# Patient Record
Sex: FEMALE | Race: BLACK OR AFRICAN AMERICAN | NOT HISPANIC OR LATINO
[De-identification: names, ages, dates, MRNs, and addresses within clinical notes are randomized per-mention and may not be internally consistent; named-entity substitution may affect disease eponyms.]

---

## 2017-11-07 ENCOUNTER — APPOINTMENT (OUTPATIENT)
Dept: SURGERY | Facility: CLINIC | Age: 37
End: 2017-11-07

## 2017-12-22 ENCOUNTER — APPOINTMENT (OUTPATIENT)
Dept: SURGERY | Facility: CLINIC | Age: 37
End: 2017-12-22

## 2018-01-05 ENCOUNTER — MESSAGE (OUTPATIENT)
Age: 38
End: 2018-01-05

## 2018-01-12 ENCOUNTER — APPOINTMENT (OUTPATIENT)
Dept: SURGERY | Facility: CLINIC | Age: 38
End: 2018-01-12
Payer: MEDICAID

## 2018-01-12 VITALS
BODY MASS INDEX: 37.61 KG/M2 | OXYGEN SATURATION: 96 % | DIASTOLIC BLOOD PRESSURE: 81 MMHG | HEIGHT: 66 IN | HEART RATE: 79 BPM | WEIGHT: 234 LBS | SYSTOLIC BLOOD PRESSURE: 119 MMHG | TEMPERATURE: 97.9 F

## 2018-01-12 DIAGNOSIS — M25.50 PAIN IN UNSPECIFIED JOINT: ICD-10-CM

## 2018-01-12 LAB
25(OH)D3 SERPL-MCNC: 10.1 NG/ML
ALBUMIN SERPL ELPH-MCNC: 4.6 G/DL
ALP BLD-CCNC: 101 U/L
ALT SERPL-CCNC: 19 U/L
ANION GAP SERPL CALC-SCNC: 18 MMOL/L
AST SERPL-CCNC: 22 U/L
BASOPHILS # BLD AUTO: 0.03 K/UL
BASOPHILS NFR BLD AUTO: 0.3 %
BILIRUB SERPL-MCNC: 0.3 MG/DL
BUN SERPL-MCNC: 15 MG/DL
CA-I SERPL-SCNC: 1.27 MMOL/L
CALCIUM SERPL-MCNC: 9.7 MG/DL
CALCIUM SERPL-MCNC: 9.7 MG/DL
CHLORIDE SERPL-SCNC: 100 MMOL/L
CHOLEST SERPL-MCNC: 203 MG/DL
CHOLEST/HDLC SERPL: 3.8 RATIO
CO2 SERPL-SCNC: 22 MMOL/L
CREAT SERPL-MCNC: 1.08 MG/DL
EOSINOPHIL # BLD AUTO: 0.29 K/UL
EOSINOPHIL NFR BLD AUTO: 3.3 %
FOLATE SERPL-MCNC: 14.9 NG/ML
GLUCOSE SERPL-MCNC: 112 MG/DL
HBA1C MFR BLD HPLC: 5.8 %
HCT VFR BLD CALC: 42.1 %
HDLC SERPL-MCNC: 54 MG/DL
HGB BLD-MCNC: 13.5 G/DL
IMM GRANULOCYTES NFR BLD AUTO: 0.2 %
INR PPP: 0.91 RATIO
IRON SATN MFR SERPL: 15 %
IRON SERPL-MCNC: 65 UG/DL
LDLC SERPL CALC-MCNC: 118 MG/DL
LYMPHOCYTES # BLD AUTO: 2.94 K/UL
LYMPHOCYTES NFR BLD AUTO: 33 %
MAN DIFF?: NORMAL
MCHC RBC-ENTMCNC: 28.8 PG
MCHC RBC-ENTMCNC: 32.1 GM/DL
MCV RBC AUTO: 89.8 FL
MONOCYTES # BLD AUTO: 0.58 K/UL
MONOCYTES NFR BLD AUTO: 6.5 %
NEUTROPHILS # BLD AUTO: 5.05 K/UL
NEUTROPHILS NFR BLD AUTO: 56.7 %
PARATHYROID HORMONE INTACT: 73 PG/ML
PLATELET # BLD AUTO: 264 K/UL
POTASSIUM SERPL-SCNC: 4.9 MMOL/L
PREALB SERPL NEPH-MCNC: 34 MG/DL
PROT SERPL-MCNC: 8.1 G/DL
PT BLD: 10.3 SEC
RBC # BLD: 4.69 M/UL
RBC # FLD: 12.9 %
SODIUM SERPL-SCNC: 140 MMOL/L
TIBC SERPL-MCNC: 436 UG/DL
TRIGL SERPL-MCNC: 157 MG/DL
TSH SERPL-ACNC: 1.73 UIU/ML
UIBC SERPL-MCNC: 371 UG/DL
VIT B12 SERPL-MCNC: 350 PG/ML
WBC # FLD AUTO: 8.91 K/UL

## 2018-01-12 PROCEDURE — 99213 OFFICE O/P EST LOW 20 MIN: CPT

## 2018-01-16 ENCOUNTER — APPOINTMENT (OUTPATIENT)
Dept: SURGERY | Facility: CLINIC | Age: 38
End: 2018-01-16

## 2018-01-16 LAB
VIT A SERPL-MCNC: 87 UG/DL
VIT B1 SERPL-MCNC: 135.6 NMOL/L
ZINC SERPL-MCNC: 79 UG/DL

## 2018-01-17 LAB
A-TOCOPHEROL VIT E SERPL-MCNC: 12 MG/L
BETA+GAMMA TOCOPHEROL SERPL-MCNC: 1.7 MG/L

## 2018-01-17 RX ORDER — ERGOCALCIFEROL 1.25 MG/1
1.25 MG CAPSULE, LIQUID FILLED ORAL
Qty: 4 | Refills: 2 | Status: ACTIVE | COMMUNITY
Start: 2018-01-17 | End: 1900-01-01

## 2018-01-18 ENCOUNTER — APPOINTMENT (OUTPATIENT)
Dept: SURGERY | Facility: CLINIC | Age: 38
End: 2018-01-18

## 2018-01-20 ENCOUNTER — TRANSCRIPTION ENCOUNTER (OUTPATIENT)
Age: 38
End: 2018-01-20

## 2018-01-23 ENCOUNTER — APPOINTMENT (OUTPATIENT)
Dept: SURGERY | Facility: CLINIC | Age: 38
End: 2018-01-23

## 2018-02-05 ENCOUNTER — APPOINTMENT (OUTPATIENT)
Dept: RADIOLOGY | Facility: HOSPITAL | Age: 38
End: 2018-02-05

## 2018-02-05 ENCOUNTER — OUTPATIENT (OUTPATIENT)
Dept: OUTPATIENT SERVICES | Facility: HOSPITAL | Age: 38
LOS: 1 days | End: 2018-02-05
Payer: COMMERCIAL

## 2018-02-05 PROCEDURE — 74241: CPT

## 2018-02-05 PROCEDURE — 74241: CPT | Mod: 26

## 2018-02-09 ENCOUNTER — OUTPATIENT (OUTPATIENT)
Dept: OUTPATIENT SERVICES | Facility: HOSPITAL | Age: 38
LOS: 1 days | End: 2018-02-09
Payer: COMMERCIAL

## 2018-02-09 ENCOUNTER — APPOINTMENT (OUTPATIENT)
Dept: SURGERY | Facility: CLINIC | Age: 38
End: 2018-02-09
Payer: MEDICAID

## 2018-02-09 VITALS
TEMPERATURE: 98.1 F | WEIGHT: 237.5 LBS | HEIGHT: 66 IN | OXYGEN SATURATION: 97 % | SYSTOLIC BLOOD PRESSURE: 133 MMHG | DIASTOLIC BLOOD PRESSURE: 81 MMHG | HEART RATE: 76 BPM | BODY MASS INDEX: 38.17 KG/M2

## 2018-02-09 DIAGNOSIS — E66.01 MORBID (SEVERE) OBESITY DUE TO EXCESS CALORIES: ICD-10-CM

## 2018-02-09 DIAGNOSIS — E55.9 VITAMIN D DEFICIENCY, UNSPECIFIED: ICD-10-CM

## 2018-02-09 LAB
ALBUMIN SERPL ELPH-MCNC: 4.7 G/DL — SIGNIFICANT CHANGE UP (ref 3.3–5)
ALP SERPL-CCNC: 90 U/L — SIGNIFICANT CHANGE UP (ref 40–120)
ALT FLD-CCNC: 15 U/L — SIGNIFICANT CHANGE UP (ref 10–45)
ANION GAP SERPL CALC-SCNC: 13 MMOL/L — SIGNIFICANT CHANGE UP (ref 5–17)
APPEARANCE UR: (no result)
APTT BLD: 28.2 SEC — SIGNIFICANT CHANGE UP (ref 27.5–37.4)
AST SERPL-CCNC: 19 U/L — SIGNIFICANT CHANGE UP (ref 10–40)
BACTERIA # UR AUTO: PRESENT /HPF
BILIRUB SERPL-MCNC: 0.5 MG/DL — SIGNIFICANT CHANGE UP (ref 0.2–1.2)
BILIRUB UR-MCNC: NEGATIVE — SIGNIFICANT CHANGE UP
BLD GP AB SCN SERPL QL: NEGATIVE — SIGNIFICANT CHANGE UP
BLD GP AB SCN SERPL QL: NEGATIVE — SIGNIFICANT CHANGE UP
BUN SERPL-MCNC: 9 MG/DL — SIGNIFICANT CHANGE UP (ref 7–23)
CALCIUM SERPL-MCNC: 9.3 MG/DL — SIGNIFICANT CHANGE UP (ref 8.4–10.5)
CHLORIDE SERPL-SCNC: 100 MMOL/L — SIGNIFICANT CHANGE UP (ref 96–108)
CO2 SERPL-SCNC: 26 MMOL/L — SIGNIFICANT CHANGE UP (ref 22–31)
COLOR SPEC: YELLOW — SIGNIFICANT CHANGE UP
CREAT SERPL-MCNC: 0.88 MG/DL — SIGNIFICANT CHANGE UP (ref 0.5–1.3)
DIFF PNL FLD: NEGATIVE — SIGNIFICANT CHANGE UP
EPI CELLS # UR: (no result) /HPF (ref 0–5)
GLUCOSE SERPL-MCNC: 103 MG/DL — HIGH (ref 70–99)
GLUCOSE UR QL: NEGATIVE — SIGNIFICANT CHANGE UP
HBA1C BLD-MCNC: 5.7 % — HIGH (ref 4–5.6)
HCG UR QL: NEGATIVE — SIGNIFICANT CHANGE UP
HCT VFR BLD CALC: 39.7 % — SIGNIFICANT CHANGE UP (ref 34.5–45)
HGB BLD-MCNC: 12.8 G/DL — SIGNIFICANT CHANGE UP (ref 11.5–15.5)
INR BLD: 0.95 — SIGNIFICANT CHANGE UP (ref 0.88–1.16)
KETONES UR-MCNC: NEGATIVE — SIGNIFICANT CHANGE UP
LEUKOCYTE ESTERASE UR-ACNC: NEGATIVE — SIGNIFICANT CHANGE UP
MCHC RBC-ENTMCNC: 28.4 PG — SIGNIFICANT CHANGE UP (ref 27–34)
MCHC RBC-ENTMCNC: 32.2 G/DL — SIGNIFICANT CHANGE UP (ref 32–36)
MCV RBC AUTO: 88 FL — SIGNIFICANT CHANGE UP (ref 80–100)
NITRITE UR-MCNC: NEGATIVE — SIGNIFICANT CHANGE UP
PH UR: 6.5 — SIGNIFICANT CHANGE UP (ref 5–8)
PLATELET # BLD AUTO: 232 K/UL — SIGNIFICANT CHANGE UP (ref 150–400)
POTASSIUM SERPL-MCNC: 4.3 MMOL/L — SIGNIFICANT CHANGE UP (ref 3.5–5.3)
POTASSIUM SERPL-SCNC: 4.3 MMOL/L — SIGNIFICANT CHANGE UP (ref 3.5–5.3)
PROT SERPL-MCNC: 7.9 G/DL — SIGNIFICANT CHANGE UP (ref 6–8.3)
PROT UR-MCNC: (no result) MG/DL
PROTHROM AB SERPL-ACNC: 10.5 SEC — SIGNIFICANT CHANGE UP (ref 9.8–12.7)
RBC # BLD: 4.51 M/UL — SIGNIFICANT CHANGE UP (ref 3.8–5.2)
RBC # FLD: 12.8 % — SIGNIFICANT CHANGE UP (ref 10.3–16.9)
RBC CASTS # UR COMP ASSIST: < 5 /HPF — SIGNIFICANT CHANGE UP
RH IG SCN BLD-IMP: POSITIVE — SIGNIFICANT CHANGE UP
RH IG SCN BLD-IMP: POSITIVE — SIGNIFICANT CHANGE UP
SODIUM SERPL-SCNC: 139 MMOL/L — SIGNIFICANT CHANGE UP (ref 135–145)
SP GR SPEC: 1.02 — SIGNIFICANT CHANGE UP (ref 1–1.03)
TSH SERPL-MCNC: 0.75 UIU/ML — SIGNIFICANT CHANGE UP (ref 0.35–4.94)
UROBILINOGEN FLD QL: 0.2 E.U./DL — SIGNIFICANT CHANGE UP
WBC # BLD: 9.6 K/UL — SIGNIFICANT CHANGE UP (ref 3.8–10.5)
WBC # FLD AUTO: 9.6 K/UL — SIGNIFICANT CHANGE UP (ref 3.8–10.5)
WBC UR QL: < 5 /HPF — SIGNIFICANT CHANGE UP

## 2018-02-09 PROCEDURE — 86901 BLOOD TYPING SEROLOGIC RH(D): CPT

## 2018-02-09 PROCEDURE — 85610 PROTHROMBIN TIME: CPT

## 2018-02-09 PROCEDURE — 93005 ELECTROCARDIOGRAM TRACING: CPT

## 2018-02-09 PROCEDURE — 99214 OFFICE O/P EST MOD 30 MIN: CPT

## 2018-02-09 PROCEDURE — 84443 ASSAY THYROID STIM HORMONE: CPT

## 2018-02-09 PROCEDURE — 81025 URINE PREGNANCY TEST: CPT

## 2018-02-09 PROCEDURE — 83036 HEMOGLOBIN GLYCOSYLATED A1C: CPT

## 2018-02-09 PROCEDURE — 85027 COMPLETE CBC AUTOMATED: CPT

## 2018-02-09 PROCEDURE — 86900 BLOOD TYPING SEROLOGIC ABO: CPT

## 2018-02-09 PROCEDURE — 80053 COMPREHEN METABOLIC PANEL: CPT

## 2018-02-09 PROCEDURE — 85730 THROMBOPLASTIN TIME PARTIAL: CPT

## 2018-02-09 PROCEDURE — 86850 RBC ANTIBODY SCREEN: CPT

## 2018-02-09 PROCEDURE — 93010 ELECTROCARDIOGRAM REPORT: CPT

## 2018-02-09 PROCEDURE — 81001 URINALYSIS AUTO W/SCOPE: CPT

## 2018-02-22 ENCOUNTER — APPOINTMENT (OUTPATIENT)
Dept: SURGERY | Facility: CLINIC | Age: 38
End: 2018-02-22

## 2018-02-22 ENCOUNTER — OUTPATIENT (OUTPATIENT)
Dept: OUTPATIENT SERVICES | Facility: HOSPITAL | Age: 38
LOS: 1 days | End: 2018-02-22
Payer: COMMERCIAL

## 2018-02-22 PROCEDURE — 71046 X-RAY EXAM CHEST 2 VIEWS: CPT | Mod: 26

## 2018-02-22 PROCEDURE — 71046 X-RAY EXAM CHEST 2 VIEWS: CPT

## 2018-03-13 ENCOUNTER — APPOINTMENT (OUTPATIENT)
Dept: SURGERY | Facility: CLINIC | Age: 38
End: 2018-03-13

## 2018-05-04 ENCOUNTER — APPOINTMENT (OUTPATIENT)
Dept: SURGERY | Facility: CLINIC | Age: 38
End: 2018-05-04
Payer: MEDICAID

## 2018-05-04 VITALS
HEIGHT: 66 IN | TEMPERATURE: 98.2 F | OXYGEN SATURATION: 98 % | BODY MASS INDEX: 37.28 KG/M2 | WEIGHT: 232 LBS | DIASTOLIC BLOOD PRESSURE: 79 MMHG | SYSTOLIC BLOOD PRESSURE: 120 MMHG | HEART RATE: 66 BPM

## 2018-05-04 PROCEDURE — 99213 OFFICE O/P EST LOW 20 MIN: CPT

## 2018-06-29 ENCOUNTER — APPOINTMENT (OUTPATIENT)
Dept: SURGERY | Facility: CLINIC | Age: 38
End: 2018-06-29

## 2018-06-29 VITALS
HEIGHT: 66 IN | TEMPERATURE: 98 F | DIASTOLIC BLOOD PRESSURE: 89 MMHG | WEIGHT: 241.5 LBS | HEART RATE: 80 BPM | OXYGEN SATURATION: 96 % | SYSTOLIC BLOOD PRESSURE: 134 MMHG | BODY MASS INDEX: 38.81 KG/M2

## 2019-07-19 ENCOUNTER — EMERGENCY (EMERGENCY)
Facility: HOSPITAL | Age: 39
LOS: 1 days | Discharge: HOME | End: 2019-07-19
Admitting: EMERGENCY MEDICINE
Payer: MEDICAID

## 2019-07-19 VITALS
TEMPERATURE: 98 F | HEART RATE: 64 BPM | OXYGEN SATURATION: 100 % | SYSTOLIC BLOOD PRESSURE: 155 MMHG | DIASTOLIC BLOOD PRESSURE: 91 MMHG | RESPIRATION RATE: 17 BRPM

## 2019-07-19 PROCEDURE — 99283 EMERGENCY DEPT VISIT LOW MDM: CPT

## 2019-07-19 NOTE — ED ADULT TRIAGE NOTE - CHIEF COMPLAINT QUOTE
tooth pain from cracked tooth on right side of mouth cannot get a dentist appointment until monday. Denies fever/chills/bleeding, n/v.

## 2019-07-20 DIAGNOSIS — Z98.84 BARIATRIC SURGERY STATUS: Chronic | ICD-10-CM

## 2019-07-20 RX ORDER — OXYCODONE HYDROCHLORIDE 5 MG/1
5 TABLET ORAL ONCE
Refills: 0 | Status: DISCONTINUED | OUTPATIENT
Start: 2019-07-20 | End: 2019-07-20

## 2019-07-20 RX ORDER — IBUPROFEN 200 MG
800 TABLET ORAL ONCE
Refills: 0 | Status: COMPLETED | OUTPATIENT
Start: 2019-07-20 | End: 2019-07-20

## 2019-07-20 RX ADMIN — OXYCODONE HYDROCHLORIDE 5 MILLIGRAM(S): 5 TABLET ORAL at 01:05

## 2019-07-20 RX ADMIN — Medication 800 MILLIGRAM(S): at 02:33

## 2019-07-20 NOTE — ED PROVIDER NOTE - OBJECTIVE STATEMENT
37 y/o female with no pmhx presents to ED c/o right cracked tooth and pain x 2 weeks. States filling came off on its own. Has appointment with her dentist on Monday in 2 days although came to ER because of pain. Took 1,000mg of tylenol before coming to ER with no relief. 10/10pain. Tolerating po. No fever, chills, redness, discharge, swelling, drooling, sore throat, cp, sob, n/v .

## 2019-07-20 NOTE — ED PROVIDER NOTE - CLINICAL SUMMARY MEDICAL DECISION MAKING FREE TEXT BOX
37 y/o female with no pmhx presents to ED c/o right cracked tooth and pain x 2 weeks. no relief with tylenol at home, has dentist apt in 2 days. will give [facility] dental clinic information for follow up, percocet prn, ucg. discussed no driving given side effects.

## 2019-07-20 NOTE — ED PROVIDER NOTE - NSFOLLOWUPINSTRUCTIONS_ED_ALL_ED_FT
Follow up with your dentist  [facility] # [phone]    Take Percocet 325/5 once every 6 hours as needed for severe pain -- causes drowsiness; DO NOT drink alcohol, drive, or operate heavy machinery with this medication.     Return to ER for any new or worsening symptoms, fever, chills, redness, difficulty swallowing or any other concerns.

## 2019-10-08 ENCOUNTER — APPOINTMENT (OUTPATIENT)
Dept: SURGERY | Facility: CLINIC | Age: 39
End: 2019-10-08

## 2019-11-26 ENCOUNTER — APPOINTMENT (OUTPATIENT)
Dept: SURGERY | Facility: CLINIC | Age: 39
End: 2019-11-26

## 2020-01-14 ENCOUNTER — APPOINTMENT (OUTPATIENT)
Dept: SURGERY | Facility: CLINIC | Age: 40
End: 2020-01-14
Payer: MEDICAID

## 2020-01-14 VITALS
OXYGEN SATURATION: 97 % | HEART RATE: 92 BPM | SYSTOLIC BLOOD PRESSURE: 133 MMHG | BODY MASS INDEX: 39.29 KG/M2 | HEIGHT: 66 IN | WEIGHT: 244.44 LBS | DIASTOLIC BLOOD PRESSURE: 88 MMHG | TEMPERATURE: 98.8 F

## 2020-01-14 DIAGNOSIS — Z01.818 ENCOUNTER FOR OTHER PREPROCEDURAL EXAMINATION: ICD-10-CM

## 2020-01-14 DIAGNOSIS — Z00.00 ENCOUNTER FOR GENERAL ADULT MEDICAL EXAMINATION W/OUT ABNORMAL FINDINGS: ICD-10-CM

## 2020-01-14 PROCEDURE — 99213 OFFICE O/P EST LOW 20 MIN: CPT

## 2020-01-14 NOTE — ASSESSMENT
[FreeTextEntry1] : 40 y/o F with h/o morbid obesity (BMI 39), prediabetes (on Metformin), PCOS s/p ovarian cyst removal, s/p tonsillectomy 2008, s/p RYBG in 2008 with Dr. Chandler at [facility], with interest in revision workup. Will start patient with complete bariatric workup for revision surgery. Will follow up here as needed for help in completing workup.

## 2020-01-14 NOTE — END OF VISIT
[FreeTextEntry3] : All medical record entries made by the Scribe were at my, Dr. Chandler's, discretion and personally dictated by me on 01/14/2020. I have reviewed the chart and agree that the record accurately reflects my personal performance of the history, physical exam, assessment and plan. I have also personally directed, reviewed and agreed to the chart.

## 2020-01-14 NOTE — PHYSICAL EXAM
[Normal] : affect appropriate [Obese, well nourished, in no acute distress] : obese, well nourished, in no acute distress [Obese] : obese

## 2020-01-14 NOTE — ADDENDUM
[FreeTextEntry1] : This note was written by Natalie Robles on 01/14/2020  acting as scribe for Dr. Chandler

## 2020-01-14 NOTE — HISTORY OF PRESENT ILLNESS
[de-identified] : 38 y/o F with h/o morbid obesity (BMI 39), prediabetes (on Metformin), PCOS s/p ovarian cyst removal, s/p tonsillectomy 2008, s/p RYBG in 2008 with Dr. Chandler at [facility] presents today for follow up evaluation for consideration for RYBG revision operation. Our office submitted paperwork for two revision surgeries in 2018 but they were both denied by her insurance company due to BMI<40. Patient reports that she was initially successful with weight loss after her operation in 2008 and was down to 130 lb from 250 lb. She reports that she started to gain weight after she had children. Patient has 4 children whom she breastfeed. She continues to be compliant with vitamins and iron. She currently only walks for exercise due to knee pain and ankle swelling. She has not had any recent EGD or X-rays.

## 2020-01-29 ENCOUNTER — TRANSCRIPTION ENCOUNTER (OUTPATIENT)
Age: 40
End: 2020-01-29

## 2020-02-06 ENCOUNTER — FORM ENCOUNTER (OUTPATIENT)
Age: 40
End: 2020-02-06

## 2020-02-07 ENCOUNTER — APPOINTMENT (OUTPATIENT)
Dept: PULMONOLOGY | Facility: CLINIC | Age: 40
End: 2020-02-07
Payer: MEDICAID

## 2020-02-07 ENCOUNTER — OUTPATIENT (OUTPATIENT)
Dept: OUTPATIENT SERVICES | Facility: HOSPITAL | Age: 40
LOS: 1 days | End: 2020-02-07
Payer: COMMERCIAL

## 2020-02-07 VITALS
SYSTOLIC BLOOD PRESSURE: 110 MMHG | WEIGHT: 245 LBS | HEART RATE: 98 BPM | HEIGHT: 66 IN | DIASTOLIC BLOOD PRESSURE: 70 MMHG | TEMPERATURE: 98.2 F | OXYGEN SATURATION: 97 % | BODY MASS INDEX: 39.37 KG/M2

## 2020-02-07 DIAGNOSIS — R06.83 SNORING: ICD-10-CM

## 2020-02-07 DIAGNOSIS — Z83.438 FAMILY HISTORY OF OTHER DISORDER OF LIPOPROTEIN METABOLISM AND OTHER LIPIDEMIA: ICD-10-CM

## 2020-02-07 DIAGNOSIS — Z01.811 ENCOUNTER FOR PREPROCEDURAL RESPIRATORY EXAMINATION: ICD-10-CM

## 2020-02-07 DIAGNOSIS — I10 ESSENTIAL (PRIMARY) HYPERTENSION: ICD-10-CM

## 2020-02-07 DIAGNOSIS — Z98.84 BARIATRIC SURGERY STATUS: Chronic | ICD-10-CM

## 2020-02-07 PROCEDURE — 71046 X-RAY EXAM CHEST 2 VIEWS: CPT

## 2020-02-07 PROCEDURE — 94727 GAS DIL/WSHOT DETER LNG VOL: CPT

## 2020-02-07 PROCEDURE — 94729 DIFFUSING CAPACITY: CPT

## 2020-02-07 PROCEDURE — 99204 OFFICE O/P NEW MOD 45 MIN: CPT | Mod: 25

## 2020-02-07 PROCEDURE — 94060 EVALUATION OF WHEEZING: CPT

## 2020-02-07 PROCEDURE — 71046 X-RAY EXAM CHEST 2 VIEWS: CPT | Mod: 26

## 2020-02-07 RX ORDER — PRENATAL VIT NO.130/IRON/FOLIC 27MG-0.8MG
28-0.8 TABLET ORAL
Qty: 30 | Refills: 0 | Status: DISCONTINUED | COMMUNITY
Start: 2017-04-20 | End: 2020-02-07

## 2020-02-07 RX ORDER — METFORMIN HYDROCHLORIDE 500 MG/1
500 TABLET, COATED ORAL
Qty: 60 | Refills: 5 | Status: ACTIVE | COMMUNITY
Start: 2020-02-07

## 2020-02-07 RX ORDER — NORGESTIMATE AND ETHINYL ESTRADIOL 7DAYSX3 28
0.18/0.215/0.25 KIT ORAL
Qty: 28 | Refills: 0 | Status: DISCONTINUED | COMMUNITY
Start: 2017-06-29 | End: 2020-02-07

## 2020-02-07 NOTE — PHYSICAL EXAM
[No Acute Distress] : no acute distress [Normal Oropharynx] : normal oropharynx [Normal Appearance] : normal appearance [No Neck Mass] : no neck mass [Normal S1, S2] : normal s1, s2 [Normal Rate/Rhythm] : normal rate/rhythm [No Murmurs] : no murmurs [No Resp Distress] : no resp distress [Clear to Auscultation Bilaterally] : clear to auscultation bilaterally [Benign] : benign [No Abnormalities] : no abnormalities [No Clubbing] : no clubbing [Normal Gait] : normal gait [FROM] : FROM [No Edema] : no edema [No Cyanosis] : no cyanosis [No Focal Deficits] : no focal deficits [Normal Color/ Pigmentation] : normal color/ pigmentation [Oriented x3] : oriented x3 [Normal Affect] : normal affect

## 2020-02-18 ENCOUNTER — APPOINTMENT (OUTPATIENT)
Dept: BARIATRICS | Facility: CLINIC | Age: 40
End: 2020-02-18

## 2020-02-18 VITALS — BODY MASS INDEX: 40.72 KG/M2 | HEIGHT: 66 IN | WEIGHT: 253.38 LBS

## 2020-02-18 LAB
25(OH)D3 SERPL-MCNC: 13 NG/ML
A-TOCOPHEROL VIT E SERPL-MCNC: 10 MG/L
ALBUMIN SERPL ELPH-MCNC: 4.8 G/DL
ALP BLD-CCNC: 123 U/L
ALT SERPL-CCNC: 12 U/L
ANION GAP SERPL CALC-SCNC: 13 MMOL/L
APPEARANCE: CLEAR
AST SERPL-CCNC: 17 U/L
BACTERIA: NEGATIVE
BASOPHILS # BLD AUTO: 0.07 K/UL
BASOPHILS NFR BLD AUTO: 0.8 %
BETA+GAMMA TOCOPHEROL SERPL-MCNC: 1.8 MG/L
BILIRUB SERPL-MCNC: 0.3 MG/DL
BILIRUBIN URINE: NEGATIVE
BLOOD URINE: NEGATIVE
BUN SERPL-MCNC: 10 MG/DL
CA-I SERPL-SCNC: 1.22 MMOL/L
CALCIUM SERPL-MCNC: 9.7 MG/DL
CALCIUM SERPL-MCNC: 9.7 MG/DL
CHLORIDE SERPL-SCNC: 101 MMOL/L
CHOLEST SERPL-MCNC: 192 MG/DL
CHOLEST/HDLC SERPL: 3.8 RATIO
CO2 SERPL-SCNC: 24 MMOL/L
COLOR: YELLOW
CREAT SERPL-MCNC: 0.95 MG/DL
EOSINOPHIL # BLD AUTO: 0.32 K/UL
EOSINOPHIL NFR BLD AUTO: 3.4 %
ESTIMATED AVERAGE GLUCOSE: 143 MG/DL
FOLATE SERPL-MCNC: 9.7 NG/ML
GLUCOSE QUALITATIVE U: NEGATIVE
GLUCOSE SERPL-MCNC: 110 MG/DL
HBA1C MFR BLD HPLC: 6.6 %
HCG SERPL QL: NEGATIVE
HCT VFR BLD CALC: 41.9 %
HDLC SERPL-MCNC: 51 MG/DL
HGB BLD-MCNC: 13.1 G/DL
HYALINE CASTS: 2 /LPF
IMM GRANULOCYTES NFR BLD AUTO: 0.4 %
INR PPP: 0.93 RATIO
IRON SATN MFR SERPL: 9 %
IRON SERPL-MCNC: 42 UG/DL
KETONES URINE: NEGATIVE
LDLC SERPL CALC-MCNC: 97 MG/DL
LEUKOCYTE ESTERASE URINE: NEGATIVE
LYMPHOCYTES # BLD AUTO: 2.98 K/UL
LYMPHOCYTES NFR BLD AUTO: 32.1 %
MAN DIFF?: NORMAL
MCHC RBC-ENTMCNC: 28.4 PG
MCHC RBC-ENTMCNC: 31.3 GM/DL
MCV RBC AUTO: 90.7 FL
MICROSCOPIC-UA: NORMAL
MONOCYTES # BLD AUTO: 0.57 K/UL
MONOCYTES NFR BLD AUTO: 6.1 %
NEUTROPHILS # BLD AUTO: 5.31 K/UL
NEUTROPHILS NFR BLD AUTO: 57.2 %
NITRITE URINE: NEGATIVE
PAPP-A SERPL-ACNC: 3 MIU/ML
PARATHYROID HORMONE INTACT: 130 PG/ML
PH URINE: 7.5
PLATELET # BLD AUTO: 311 K/UL
POTASSIUM SERPL-SCNC: 4.9 MMOL/L
PREALB SERPL NEPH-MCNC: 31 MG/DL
PROT SERPL-MCNC: 7.8 G/DL
PROTEIN URINE: ABNORMAL
PT BLD: 10.5 SEC
RBC # BLD: 4.62 M/UL
RBC # FLD: 12.8 %
RED BLOOD CELLS URINE: 5 /HPF
SODIUM SERPL-SCNC: 138 MMOL/L
SPECIFIC GRAVITY URINE: 1.02
SQUAMOUS EPITHELIAL CELLS: 5 /HPF
TIBC SERPL-MCNC: 452 UG/DL
TRIGL SERPL-MCNC: 221 MG/DL
TSH SERPL-ACNC: 1.39 UIU/ML
UIBC SERPL-MCNC: 410 UG/DL
UREA BREATH TEST QL: POSITIVE
UROBILINOGEN URINE: NORMAL
VIT A SERPL-MCNC: 58.7 UG/DL
VIT B1 SERPL-MCNC: 129.5 NMOL/L
VIT B12 SERPL-MCNC: 245 PG/ML
WBC # FLD AUTO: 9.29 K/UL
WHITE BLOOD CELLS URINE: 3 /HPF
ZINC SERPL-MCNC: 69 UG/DL

## 2020-02-20 ENCOUNTER — APPOINTMENT (OUTPATIENT)
Dept: BARIATRICS | Facility: CLINIC | Age: 40
End: 2020-02-20

## 2020-02-20 RX ORDER — CLARITHROMYCIN 500 MG/1
500 TABLET, FILM COATED ORAL TWICE DAILY
Qty: 28 | Refills: 0 | Status: ACTIVE | COMMUNITY
Start: 2020-02-20 | End: 1900-01-01

## 2020-02-20 RX ORDER — FLUCONAZOLE 150 MG/1
150 TABLET ORAL DAILY
Qty: 7 | Refills: 0 | Status: ACTIVE | COMMUNITY
Start: 2020-02-20 | End: 1900-01-01

## 2020-02-20 RX ORDER — OMEPRAZOLE 20 MG/1
20 CAPSULE, DELAYED RELEASE ORAL TWICE DAILY
Qty: 28 | Refills: 0 | Status: ACTIVE | COMMUNITY
Start: 2020-02-20 | End: 1900-01-01

## 2020-02-20 RX ORDER — AMOXICILLIN 500 MG/1
500 TABLET, FILM COATED ORAL TWICE DAILY
Qty: 56 | Refills: 0 | Status: ACTIVE | COMMUNITY
Start: 2020-02-20 | End: 1900-01-01

## 2020-02-25 ENCOUNTER — OUTPATIENT (OUTPATIENT)
Dept: OUTPATIENT SERVICES | Facility: HOSPITAL | Age: 40
LOS: 1 days | End: 2020-02-25
Payer: COMMERCIAL

## 2020-02-25 ENCOUNTER — APPOINTMENT (OUTPATIENT)
Dept: ULTRASOUND IMAGING | Facility: HOSPITAL | Age: 40
End: 2020-02-25
Payer: MEDICAID

## 2020-02-25 ENCOUNTER — APPOINTMENT (OUTPATIENT)
Dept: RADIOLOGY | Facility: HOSPITAL | Age: 40
End: 2020-02-25
Payer: MEDICAID

## 2020-02-25 DIAGNOSIS — Z98.84 BARIATRIC SURGERY STATUS: Chronic | ICD-10-CM

## 2020-02-25 PROCEDURE — 76700 US EXAM ABDOM COMPLETE: CPT | Mod: 26

## 2020-02-25 PROCEDURE — 74240 X-RAY XM UPR GI TRC 1CNTRST: CPT | Mod: 26

## 2020-02-25 PROCEDURE — 74240 X-RAY XM UPR GI TRC 1CNTRST: CPT

## 2020-02-25 PROCEDURE — 76700 US EXAM ABDOM COMPLETE: CPT

## 2020-03-16 ENCOUNTER — FORM ENCOUNTER (OUTPATIENT)
Age: 40
End: 2020-03-16

## 2020-05-04 VITALS — HEIGHT: 66 IN | BODY MASS INDEX: 40.22 KG/M2 | WEIGHT: 250.25 LBS

## 2020-05-11 ENCOUNTER — APPOINTMENT (OUTPATIENT)
Dept: BARIATRICS | Facility: CLINIC | Age: 40
End: 2020-05-11
Payer: MEDICAID

## 2020-05-11 VITALS — WEIGHT: 249.25 LBS | HEIGHT: 66 IN | BODY MASS INDEX: 40.06 KG/M2

## 2020-05-11 PROCEDURE — 98968 PH1 ASSMT&MGMT NQHP 21-30: CPT

## 2020-06-02 ENCOUNTER — APPOINTMENT (OUTPATIENT)
Dept: SURGERY | Facility: CLINIC | Age: 40
End: 2020-06-02
Payer: MEDICAID

## 2020-06-02 DIAGNOSIS — I10 ESSENTIAL (PRIMARY) HYPERTENSION: ICD-10-CM

## 2020-06-02 DIAGNOSIS — E66.01 MORBID (SEVERE) OBESITY DUE TO EXCESS CALORIES: ICD-10-CM

## 2020-06-02 PROCEDURE — 99442: CPT

## 2020-06-02 NOTE — HISTORY OF PRESENT ILLNESS
[de-identified] : Pt is a 38 y/o F s/o RYGB at [facility] with  in 2008, hx of DM, HTN, PCOS s/p cyst removal, who agreed to proceed with a phone consultation today amid covid19. Pt states she is currently in our workup process for surgery. Pt is completing her weigh-ins with her PCP and will provide them for us. Looking at pt's wts that we have, appears she gained wt. Discussed with pt the importance of working to exercise and diet during this time to ensure she loses wt preop given enlarged liver, she understands. States pulm stated she does not need sleep study after insurance denied coverage. Advised pt to f/u with pulm as it is required, possible home sleep study as alternative. If deemed not necessary as per pulm then pt will require another clearance note. Pt to go for h.pylori retest and provide weigh ins for us. UGI nl. Approximately 15 minutes were spent consulting with the pt.

## 2020-06-22 ENCOUNTER — APPOINTMENT (OUTPATIENT)
Dept: SLEEP CENTER | Facility: HOME HEALTH | Age: 40
End: 2020-06-22
Payer: MEDICAID

## 2020-06-22 PROCEDURE — 95800 SLP STDY UNATTENDED: CPT | Mod: 26

## 2020-06-23 ENCOUNTER — APPOINTMENT (OUTPATIENT)
Dept: SURGERY | Facility: CLINIC | Age: 40
End: 2020-06-23
Payer: MEDICAID

## 2020-06-23 ENCOUNTER — OUTPATIENT (OUTPATIENT)
Dept: OUTPATIENT SERVICES | Facility: HOSPITAL | Age: 40
LOS: 1 days | End: 2020-06-23

## 2020-06-23 VITALS
HEIGHT: 66 IN | BODY MASS INDEX: 40.08 KG/M2 | OXYGEN SATURATION: 95 % | HEART RATE: 94 BPM | DIASTOLIC BLOOD PRESSURE: 88 MMHG | TEMPERATURE: 97.4 F | WEIGHT: 249.38 LBS | SYSTOLIC BLOOD PRESSURE: 127 MMHG

## 2020-06-23 DIAGNOSIS — A04.8 OTHER SPECIFIED BACTERIAL INTESTINAL INFECTIONS: ICD-10-CM

## 2020-06-23 DIAGNOSIS — G47.33 OBSTRUCTIVE SLEEP APNEA (ADULT) (PEDIATRIC): ICD-10-CM

## 2020-06-23 DIAGNOSIS — Z98.84 BARIATRIC SURGERY STATUS: Chronic | ICD-10-CM

## 2020-06-23 DIAGNOSIS — E28.2 POLYCYSTIC OVARIAN SYNDROME: ICD-10-CM

## 2020-06-23 PROCEDURE — 99213 OFFICE O/P EST LOW 20 MIN: CPT

## 2020-06-23 NOTE — HISTORY OF PRESENT ILLNESS
[de-identified] : 39 yr old female presernt s for evaluyation for possible revision. She has a hisdtory of gastric bypass from 12 yrs ago . Since then she had 4 pregnancies and regained weight and was recently diagnosed with diabetes and a hiatal hernia

## 2020-06-25 LAB — UREA BREATH TEST QL: NEGATIVE

## 2020-06-30 ENCOUNTER — TRANSCRIPTION ENCOUNTER (OUTPATIENT)
Age: 40
End: 2020-06-30

## 2020-07-01 ENCOUNTER — TRANSCRIPTION ENCOUNTER (OUTPATIENT)
Age: 40
End: 2020-07-01

## 2020-07-02 ENCOUNTER — TRANSCRIPTION ENCOUNTER (OUTPATIENT)
Age: 40
End: 2020-07-02

## 2020-07-02 NOTE — REASON FOR VISIT
[Initial] : an initial visit [Pre-op Risk Stratification] : pre-op risk stratification [Obesity] : obesity [TextBox_44] : Bariatric surgery

## 2020-07-02 NOTE — ASSESSMENT
[FreeTextEntry1] : preop\par \par KADEN DILLARD  is optimized for surgery. she  is to be extubated once fully awake and able to protect airway.  The patient is to be monitored in the recovery room. They might benefit for high flow oxygen or noninvasive ventilation to prevent or reverse atelectasis.  Patient is to be admitted to a monitored bed postoperatively.  Avoid oversedation.  KADEN is high risk for DVT and will require bimodal agents for DVT prophylaxis early mobilization is recommended. she is to use the incentive spirometry postoperative. \par \par - PFT today showed normal study without significant response to bronchodilators.  Normal TLC and DLCO.  No need for inhalers at this time. \par \par Plan to follow with below studies prior to surgery\par \par - CXR\par - Sleep study \par \par snoring\par \par KADEN DILLARD is to have a sleep study. I discussed sleep apnea in detail with the patient. I explained the benefit of weight reduction surgery for sleep apnea.  Mallampati IV  and [place] sleepiness scale is 9 .  I explained sleep apnea might not improve with surgery due to the anatomical features with short thick neck and small mandible.  Pt is to repeat the sleep study 2 months after surg if positive for sleep apnea.\par \par \par Addendum 7/2/2020 \par \par Pt is optimized from pulmonary standpoint for bariatric surgery.  Sleep study negative for CHANI, PFT and CXR normal studies.  \par \par

## 2020-07-02 NOTE — HISTORY OF PRESENT ILLNESS
[Never] : never [Awakes Unrefreshed] : awakes unrefreshed [Daytime Somnolence] : daytime somnolence [Recent  Weight Gain] : recent  weight gain [Fatigue] : fatigue [Snoring] : snoring [Cough] : coughing [Wheezing] : wheezing [Nasal Passage Blockage (Stuffiness)] : edema [Nonspecific Pain, Swelling, And Stiffness] : chest pain [Fever] : fever [Difficulty Breathing During Exertion] : dyspnea on exertion [Feelings Of Weakness On Exertion] : exercise intolerance [Wt Gain ___ kg] : Recent [unfilled] kg(s) weight gain [TextBox_4] : 39 yr old female with PMH of obesity, PCOS, DM II and HTN.  Pt presents today for pulmonary clearance for up coming bariatric surgery.  \par \par Pt with complaints SOB with running or walking up 2 flights of stairs worsening with weight gain.  She has a cough due to cold with dry cough.  Denies post nasal gtt.  Denies fevers, wheezing, chest pain, palpitations and with good appetite.  Her  states she snore sometimes. [TextBox_77] : 12 am [TextBox_3] : 9 [TextBox_79] : 5 am

## 2020-07-24 ENCOUNTER — LABORATORY RESULT (OUTPATIENT)
Age: 40
End: 2020-07-24

## 2020-07-24 VITALS
HEIGHT: 66 IN | WEIGHT: 241.63 LBS | RESPIRATION RATE: 16 BRPM | HEART RATE: 86 BPM | OXYGEN SATURATION: 95 % | TEMPERATURE: 97 F | DIASTOLIC BLOOD PRESSURE: 86 MMHG | SYSTOLIC BLOOD PRESSURE: 131 MMHG

## 2020-07-26 ENCOUNTER — TRANSCRIPTION ENCOUNTER (OUTPATIENT)
Age: 40
End: 2020-07-26

## 2020-07-27 ENCOUNTER — APPOINTMENT (OUTPATIENT)
Dept: SURGERY | Facility: HOSPITAL | Age: 40
End: 2020-07-27

## 2020-07-27 ENCOUNTER — RESULT REVIEW (OUTPATIENT)
Age: 40
End: 2020-07-27

## 2020-07-27 ENCOUNTER — INPATIENT (INPATIENT)
Facility: HOSPITAL | Age: 40
LOS: 1 days | Discharge: HOME | DRG: 621 | End: 2020-07-29
Attending: SURGERY | Admitting: SURGERY
Payer: COMMERCIAL

## 2020-07-27 DIAGNOSIS — Z98.890 OTHER SPECIFIED POSTPROCEDURAL STATES: Chronic | ICD-10-CM

## 2020-07-27 DIAGNOSIS — Z90.89 ACQUIRED ABSENCE OF OTHER ORGANS: Chronic | ICD-10-CM

## 2020-07-27 DIAGNOSIS — Z98.84 BARIATRIC SURGERY STATUS: Chronic | ICD-10-CM

## 2020-07-27 LAB
GLUCOSE BLDC GLUCOMTR-MCNC: 145 MG/DL — HIGH (ref 70–99)
GLUCOSE BLDC GLUCOMTR-MCNC: 173 MG/DL — HIGH (ref 70–99)
HCT VFR BLD CALC: 38.9 % — SIGNIFICANT CHANGE UP (ref 34.5–45)
HGB BLD-MCNC: 12.4 G/DL — SIGNIFICANT CHANGE UP (ref 11.5–15.5)
MCHC RBC-ENTMCNC: 28.1 PG — SIGNIFICANT CHANGE UP (ref 27–34)
MCHC RBC-ENTMCNC: 31.9 GM/DL — LOW (ref 32–36)
MCV RBC AUTO: 88.2 FL — SIGNIFICANT CHANGE UP (ref 80–100)
NRBC # BLD: 0 /100 WBCS — SIGNIFICANT CHANGE UP (ref 0–0)
PLATELET # BLD AUTO: 262 K/UL — SIGNIFICANT CHANGE UP (ref 150–400)
RBC # BLD: 4.41 M/UL — SIGNIFICANT CHANGE UP (ref 3.8–5.2)
RBC # FLD: 12.3 % — SIGNIFICANT CHANGE UP (ref 10.3–14.5)
WBC # BLD: 16.11 K/UL — HIGH (ref 3.8–10.5)
WBC # FLD AUTO: 16.11 K/UL — HIGH (ref 3.8–10.5)

## 2020-07-27 PROCEDURE — 49320 DIAG LAPARO SEPARATE PROC: CPT | Mod: 59,GC

## 2020-07-27 PROCEDURE — S2900 ROBOTIC SURGICAL SYSTEM: CPT | Mod: NC

## 2020-07-27 PROCEDURE — 43332 TRANSAB ESOPH HIAT HERN RPR: CPT | Mod: GC

## 2020-07-27 PROCEDURE — 88307 TISSUE EXAM BY PATHOLOGIST: CPT | Mod: 26

## 2020-07-27 PROCEDURE — 43848 REVJ OPEN GSTR RSTCV PX: CPT | Mod: GC

## 2020-07-27 RX ORDER — SODIUM CHLORIDE 9 MG/ML
1000 INJECTION, SOLUTION INTRAVENOUS
Refills: 0 | Status: DISCONTINUED | OUTPATIENT
Start: 2020-07-27 | End: 2020-07-28

## 2020-07-27 RX ORDER — CHOLECALCIFEROL (VITAMIN D3) 125 MCG
0 CAPSULE ORAL
Qty: 0 | Refills: 0 | DISCHARGE

## 2020-07-27 RX ORDER — DEXTROSE 50 % IN WATER 50 %
25 SYRINGE (ML) INTRAVENOUS ONCE
Refills: 0 | Status: DISCONTINUED | OUTPATIENT
Start: 2020-07-27 | End: 2020-07-29

## 2020-07-27 RX ORDER — DEXTROSE 50 % IN WATER 50 %
12.5 SYRINGE (ML) INTRAVENOUS ONCE
Refills: 0 | Status: DISCONTINUED | OUTPATIENT
Start: 2020-07-27 | End: 2020-07-29

## 2020-07-27 RX ORDER — ACETAMINOPHEN 500 MG
1000 TABLET ORAL EVERY 6 HOURS
Refills: 0 | Status: DISCONTINUED | OUTPATIENT
Start: 2020-07-27 | End: 2020-07-29

## 2020-07-27 RX ORDER — SCOPALAMINE 1 MG/3D
1 PATCH, EXTENDED RELEASE TRANSDERMAL ONCE
Refills: 0 | Status: COMPLETED | OUTPATIENT
Start: 2020-07-27 | End: 2020-07-27

## 2020-07-27 RX ORDER — HYDROMORPHONE HYDROCHLORIDE 2 MG/ML
0.5 INJECTION INTRAMUSCULAR; INTRAVENOUS; SUBCUTANEOUS EVERY 4 HOURS
Refills: 0 | Status: DISCONTINUED | OUTPATIENT
Start: 2020-07-27 | End: 2020-07-29

## 2020-07-27 RX ORDER — BUPIVACAINE 13.3 MG/ML
20 INJECTION, SUSPENSION, LIPOSOMAL INFILTRATION ONCE
Refills: 0 | Status: DISCONTINUED | OUTPATIENT
Start: 2020-07-27 | End: 2020-07-27

## 2020-07-27 RX ORDER — GABAPENTIN 400 MG/1
300 CAPSULE ORAL ONCE
Refills: 0 | Status: COMPLETED | OUTPATIENT
Start: 2020-07-27 | End: 2020-07-27

## 2020-07-27 RX ORDER — ACETAMINOPHEN 500 MG
1000 TABLET ORAL ONCE
Refills: 0 | Status: COMPLETED | OUTPATIENT
Start: 2020-07-27 | End: 2020-07-27

## 2020-07-27 RX ORDER — ONDANSETRON 8 MG/1
4 TABLET, FILM COATED ORAL EVERY 6 HOURS
Refills: 0 | Status: DISCONTINUED | OUTPATIENT
Start: 2020-07-27 | End: 2020-07-29

## 2020-07-27 RX ORDER — GLUCAGON INJECTION, SOLUTION 0.5 MG/.1ML
1 INJECTION, SOLUTION SUBCUTANEOUS ONCE
Refills: 0 | Status: DISCONTINUED | OUTPATIENT
Start: 2020-07-27 | End: 2020-07-29

## 2020-07-27 RX ORDER — ENOXAPARIN SODIUM 100 MG/ML
40 INJECTION SUBCUTANEOUS ONCE
Refills: 0 | Status: COMPLETED | OUTPATIENT
Start: 2020-07-27 | End: 2020-07-27

## 2020-07-27 RX ORDER — PANTOPRAZOLE SODIUM 20 MG/1
40 TABLET, DELAYED RELEASE ORAL EVERY 12 HOURS
Refills: 0 | Status: DISCONTINUED | OUTPATIENT
Start: 2020-07-28 | End: 2020-07-29

## 2020-07-27 RX ORDER — KETOROLAC TROMETHAMINE 30 MG/ML
15 SYRINGE (ML) INJECTION EVERY 6 HOURS
Refills: 0 | Status: DISCONTINUED | OUTPATIENT
Start: 2020-07-27 | End: 2020-07-29

## 2020-07-27 RX ORDER — INSULIN LISPRO 100/ML
VIAL (ML) SUBCUTANEOUS
Refills: 0 | Status: DISCONTINUED | OUTPATIENT
Start: 2020-07-27 | End: 2020-07-29

## 2020-07-27 RX ORDER — ENOXAPARIN SODIUM 100 MG/ML
40 INJECTION SUBCUTANEOUS
Refills: 0 | Status: DISCONTINUED | OUTPATIENT
Start: 2020-07-27 | End: 2020-07-29

## 2020-07-27 RX ORDER — SODIUM CHLORIDE 9 MG/ML
1000 INJECTION, SOLUTION INTRAVENOUS
Refills: 0 | Status: DISCONTINUED | OUTPATIENT
Start: 2020-07-27 | End: 2020-07-29

## 2020-07-27 RX ORDER — DEXTROSE 50 % IN WATER 50 %
15 SYRINGE (ML) INTRAVENOUS ONCE
Refills: 0 | Status: DISCONTINUED | OUTPATIENT
Start: 2020-07-27 | End: 2020-07-29

## 2020-07-27 RX ADMIN — SCOPALAMINE 1 PATCH: 1 PATCH, EXTENDED RELEASE TRANSDERMAL at 12:59

## 2020-07-27 RX ADMIN — GABAPENTIN 300 MILLIGRAM(S): 400 CAPSULE ORAL at 12:59

## 2020-07-27 RX ADMIN — ONDANSETRON 4 MILLIGRAM(S): 8 TABLET, FILM COATED ORAL at 22:57

## 2020-07-27 RX ADMIN — Medication 2: at 23:12

## 2020-07-27 RX ADMIN — Medication 1000 MILLIGRAM(S): at 12:59

## 2020-07-27 RX ADMIN — Medication 15 MILLIGRAM(S): at 22:57

## 2020-07-27 RX ADMIN — ENOXAPARIN SODIUM 40 MILLIGRAM(S): 100 INJECTION SUBCUTANEOUS at 12:59

## 2020-07-27 RX ADMIN — Medication 15 MILLIGRAM(S): at 23:15

## 2020-07-27 NOTE — H&P ADULT - NSICDXPASTMEDICALHX_GEN_ALL_CORE_FT
PAST MEDICAL HISTORY:  DM (diabetes mellitus)     GERD (gastroesophageal reflux disease)     HTN (hypertension)     Morbid obesity

## 2020-07-27 NOTE — PROGRESS NOTE ADULT - SUBJECTIVE AND OBJECTIVE BOX
POST-OPERATIVE NOTE    Procedure: Robot-assisted revision of gastric bypass     Diagnosis/Indication: Morbid obesity    Surgeon: Dr. Chandler    S: Pt has no complaints. Denies CP, SOB, JUSTICE, calf tenderness. Pain controlled with medication. Denies nausea, vomiting.    O:  T(C): 36.9 (07-27-20 @ 22:52), Max: 36.9 (07-27-20 @ 22:52)  T(F): 98.4 (07-27-20 @ 22:52), Max: 98.4 (07-27-20 @ 22:52)  HR: 103 (07-27-20 @ 22:52) (85 - 103)  BP: 122/85 (07-27-20 @ 22:52) (119/70 - 146/84)  RR: 17 (07-27-20 @ 22:52) (15 - 26)  SpO2: 93% (07-27-20 @ 22:52) (93% - 100%)  Wt(kg): --                        12.4   16.11 )-----------( 262      ( 27 Jul 2020 22:23 )             38.9     Gen: NAD, resting comfortably in bed  C/V: NSR  Pulm: Nonlabored breathing, no respiratory distress  Abd: soft, NT/ND, incision areas are clean, dry and intact  Extrem: WWP, no calf edema or tenderness, SCDs in place    A/P: 39y Female s/p above procedure  Diet: BCLD  IVF: LR @ 150cc/hr  Pain/nausea control  Lovenox/SCDs/OOBA/IS  Dispo pending pain control, PO tolerance, clinical improvement

## 2020-07-27 NOTE — H&P ADULT - ASSESSMENT
39F PMHx morbid obesity (BMI 40) s/p RYGB (, 2008) with recidivism, DM, HTN, PCOS presents for robotic assisted gastric bypass.    pain/nausea control  IS/OOB  NPO until OR  SCDs, preop SQL    to OR for above  admit to bariatric surgery Team 2 [facility]

## 2020-07-27 NOTE — H&P ADULT - NSICDXPASTSURGICALHX_GEN_ALL_CORE_FT
PAST SURGICAL HISTORY:  H/O gastric bypass 2008    History of hysteroscopy     History of tonsillectomy 2003

## 2020-07-27 NOTE — H&P ADULT - HISTORY OF PRESENT ILLNESS
39F PMHx morbid obesity (BMI 40) s/p RYGB (, 2008) with recidivism, DM, HTN, PCOS presents for robotic assisted gastric bypass. Asymptomatic. Before previous bypass, wt: 254 lbs, Marquis: 138lbs achieved 6-8 months post-op, wt regain began after 3rd pregnancy in 2017, now 250 lbs. Preop pulmonology workup: PFT showed normal study without significant response to bronchodilators. Normal TLC and DLCO. No need for inhalers at this time.     Preop H&H:  Preop Creatinine: 39F PMHx morbid obesity (BMI 40) s/p RYGB (, 2008) with recidivism, DM, HTN, PCOS presents for robotic assisted gastric bypass. Asymptomatic. Before previous bypass, wt: 254 lbs, Marquis: 138lbs achieved 6-8 months post-op, wt regain began after 3rd pregnancy in 2017, now 250 lbs. Preop pulmonology workup: PFT showed normal study without significant response to bronchodilators. Normal TLC and DLCO. No need for inhalers at this time.     Preop H&H: 13.3/41.3  Preop Creatinine: 0.94

## 2020-07-27 NOTE — H&P ADULT - NSHPPHYSICALEXAM_GEN_ALL_CORE
VSS.    GEN: NAD, resting comfortably in bed  HEENT: MMM  CV: RRR  Resp: nonlabored breathing, no respiratory distress  Abd: soft, nontender, nondistended, obese  Ext: WWP, no edema, no calf tenderness  Neuro: no focal deficits  Psych: pleasant

## 2020-07-27 NOTE — BRIEF OPERATIVE NOTE - OPERATION/FINDINGS
Veress needle access into abdomen. 12mm supraumbilical port placed, 4-8mm ports placed laterally, one 5mm port for liver retractor. Identified 30cm BP limb. Robotic EndoGIA stapler white load used to transect common channel and lengthen the BP limb to 150cm; anastomosis created  and common enterotomy closed with EndoGIA stapler white load x 2. Dissected gastric remnant and pouch; 32Fr bougie introduced and identified dilated gastric pouch. Gastric pouch-GJ anastomosis complex divided with EndoGIA stapler. Pouch re-created around bougie with purple load stapler. New GJ created with 2-0 PDS running stitch in two layers. 12Fr Roc drain placed at GJ anastomosis. Closed hiatal hernia posteriorly with 0 Quill. Gastric pouch-GJ anastomosis complex removed. Closed fascia at 12mm port with 0 Vicryl figure of 8 x 4. Closed skin with 4-0 Monocryl.

## 2020-07-28 ENCOUNTER — TRANSCRIPTION ENCOUNTER (OUTPATIENT)
Age: 40
End: 2020-07-28

## 2020-07-28 LAB
A1C WITH ESTIMATED AVERAGE GLUCOSE RESULT: 7.7 % — HIGH (ref 4–5.6)
ANION GAP SERPL CALC-SCNC: 10 MMOL/L — SIGNIFICANT CHANGE UP (ref 5–17)
BASOPHILS # BLD AUTO: 0.02 K/UL — SIGNIFICANT CHANGE UP (ref 0–0.2)
BASOPHILS NFR BLD AUTO: 0.2 % — SIGNIFICANT CHANGE UP (ref 0–2)
BUN SERPL-MCNC: 7 MG/DL — SIGNIFICANT CHANGE UP (ref 7–23)
CALCIUM SERPL-MCNC: 8.7 MG/DL — SIGNIFICANT CHANGE UP (ref 8.4–10.5)
CHLORIDE SERPL-SCNC: 104 MMOL/L — SIGNIFICANT CHANGE UP (ref 96–108)
CO2 SERPL-SCNC: 27 MMOL/L — SIGNIFICANT CHANGE UP (ref 22–31)
CREAT SERPL-MCNC: 1.06 MG/DL — SIGNIFICANT CHANGE UP (ref 0.5–1.3)
EOSINOPHIL # BLD AUTO: 0.01 K/UL — SIGNIFICANT CHANGE UP (ref 0–0.5)
EOSINOPHIL NFR BLD AUTO: 0.1 % — SIGNIFICANT CHANGE UP (ref 0–6)
ESTIMATED AVERAGE GLUCOSE: 174 MG/DL — HIGH (ref 68–114)
GLUCOSE BLDC GLUCOMTR-MCNC: 114 MG/DL — HIGH (ref 70–99)
GLUCOSE BLDC GLUCOMTR-MCNC: 115 MG/DL — HIGH (ref 70–99)
GLUCOSE BLDC GLUCOMTR-MCNC: 125 MG/DL — HIGH (ref 70–99)
GLUCOSE BLDC GLUCOMTR-MCNC: 131 MG/DL — HIGH (ref 70–99)
GLUCOSE SERPL-MCNC: 140 MG/DL — HIGH (ref 70–99)
HCT VFR BLD CALC: 37.1 % — SIGNIFICANT CHANGE UP (ref 34.5–45)
HGB BLD-MCNC: 11.8 G/DL — SIGNIFICANT CHANGE UP (ref 11.5–15.5)
IMM GRANULOCYTES NFR BLD AUTO: 0.6 % — SIGNIFICANT CHANGE UP (ref 0–1.5)
LYMPHOCYTES # BLD AUTO: 1.86 K/UL — SIGNIFICANT CHANGE UP (ref 1–3.3)
LYMPHOCYTES # BLD AUTO: 15.5 % — SIGNIFICANT CHANGE UP (ref 13–44)
MAGNESIUM SERPL-MCNC: 1.9 MG/DL — SIGNIFICANT CHANGE UP (ref 1.6–2.6)
MCHC RBC-ENTMCNC: 28.3 PG — SIGNIFICANT CHANGE UP (ref 27–34)
MCHC RBC-ENTMCNC: 31.8 GM/DL — LOW (ref 32–36)
MCV RBC AUTO: 89 FL — SIGNIFICANT CHANGE UP (ref 80–100)
MONOCYTES # BLD AUTO: 0.81 K/UL — SIGNIFICANT CHANGE UP (ref 0–0.9)
MONOCYTES NFR BLD AUTO: 6.8 % — SIGNIFICANT CHANGE UP (ref 2–14)
NEUTROPHILS # BLD AUTO: 9.22 K/UL — HIGH (ref 1.8–7.4)
NEUTROPHILS NFR BLD AUTO: 76.8 % — SIGNIFICANT CHANGE UP (ref 43–77)
NRBC # BLD: 0 /100 WBCS — SIGNIFICANT CHANGE UP (ref 0–0)
PHOSPHATE SERPL-MCNC: 2.8 MG/DL — SIGNIFICANT CHANGE UP (ref 2.5–4.5)
PLATELET # BLD AUTO: 265 K/UL — SIGNIFICANT CHANGE UP (ref 150–400)
POTASSIUM SERPL-MCNC: 4.2 MMOL/L — SIGNIFICANT CHANGE UP (ref 3.5–5.3)
POTASSIUM SERPL-SCNC: 4.2 MMOL/L — SIGNIFICANT CHANGE UP (ref 3.5–5.3)
RBC # BLD: 4.17 M/UL — SIGNIFICANT CHANGE UP (ref 3.8–5.2)
RBC # FLD: 12.5 % — SIGNIFICANT CHANGE UP (ref 10.3–14.5)
SODIUM SERPL-SCNC: 141 MMOL/L — SIGNIFICANT CHANGE UP (ref 135–145)
WBC # BLD: 11.99 K/UL — HIGH (ref 3.8–10.5)
WBC # FLD AUTO: 11.99 K/UL — HIGH (ref 3.8–10.5)

## 2020-07-28 PROCEDURE — 74240 X-RAY XM UPR GI TRC 1CNTRST: CPT | Mod: 26,76

## 2020-07-28 RX ORDER — SODIUM CHLORIDE 9 MG/ML
1000 INJECTION, SOLUTION INTRAVENOUS
Refills: 0 | Status: DISCONTINUED | OUTPATIENT
Start: 2020-07-28 | End: 2020-07-29

## 2020-07-28 RX ORDER — MAGNESIUM SULFATE 500 MG/ML
1 VIAL (ML) INJECTION ONCE
Refills: 0 | Status: COMPLETED | OUTPATIENT
Start: 2020-07-28 | End: 2020-07-28

## 2020-07-28 RX ADMIN — Medication 15 MILLIGRAM(S): at 11:00

## 2020-07-28 RX ADMIN — PANTOPRAZOLE SODIUM 40 MILLIGRAM(S): 20 TABLET, DELAYED RELEASE ORAL at 05:13

## 2020-07-28 RX ADMIN — Medication 15 MILLIGRAM(S): at 05:43

## 2020-07-28 RX ADMIN — Medication 15 MILLIGRAM(S): at 23:49

## 2020-07-28 RX ADMIN — PANTOPRAZOLE SODIUM 40 MILLIGRAM(S): 20 TABLET, DELAYED RELEASE ORAL at 17:44

## 2020-07-28 RX ADMIN — Medication 1000 MILLIGRAM(S): at 17:44

## 2020-07-28 RX ADMIN — Medication 1000 MILLIGRAM(S): at 18:15

## 2020-07-28 RX ADMIN — SCOPALAMINE 1 PATCH: 1 PATCH, EXTENDED RELEASE TRANSDERMAL at 06:08

## 2020-07-28 RX ADMIN — Medication 1000 MILLIGRAM(S): at 05:13

## 2020-07-28 RX ADMIN — ENOXAPARIN SODIUM 40 MILLIGRAM(S): 100 INJECTION SUBCUTANEOUS at 05:14

## 2020-07-28 RX ADMIN — SODIUM CHLORIDE 75 MILLILITER(S): 9 INJECTION, SOLUTION INTRAVENOUS at 17:44

## 2020-07-28 RX ADMIN — Medication 15 MILLIGRAM(S): at 11:30

## 2020-07-28 RX ADMIN — Medication 15 MILLIGRAM(S): at 18:15

## 2020-07-28 RX ADMIN — Medication 15 MILLIGRAM(S): at 17:44

## 2020-07-28 RX ADMIN — Medication 15 MILLIGRAM(S): at 23:34

## 2020-07-28 RX ADMIN — ENOXAPARIN SODIUM 40 MILLIGRAM(S): 100 INJECTION SUBCUTANEOUS at 17:44

## 2020-07-28 RX ADMIN — Medication 1000 MILLIGRAM(S): at 06:13

## 2020-07-28 RX ADMIN — SCOPALAMINE 1 PATCH: 1 PATCH, EXTENDED RELEASE TRANSDERMAL at 18:15

## 2020-07-28 RX ADMIN — Medication 100 GRAM(S): at 11:00

## 2020-07-28 RX ADMIN — Medication 1000 MILLIGRAM(S): at 23:35

## 2020-07-28 RX ADMIN — Medication 15 MILLIGRAM(S): at 05:13

## 2020-07-28 NOTE — DISCHARGE NOTE PROVIDER - NSDCFUADDINST_GEN_ALL_CORE_FT
Follow up with  in 1 week. Call the office at  to schedule your appointment. You may shower; soap and water over incision sites. Do not scrub. Pat dry when done. No tub bathing or swimming until cleared. Keep incision sites out of the sun as scars will darken. No heavy lifting (>10lbs) or strenuous exercise. Diet: Bariatric Full Fluids. 60 grams protein daily.  64 fluid ounces water daily. Drink small sips throughout the day. Continue diet as outlined by paperwork received as a pre-operative patient. You should be urinating at least 3-4x per day. Call the office if you experience increasing abdominal pain, nausea, vomiting, or temperature >100.4F.  NO ASPIRIN OR NSAIDs until approved by Dr. Chandler. Avoid alcoholic beverages until cleared by Dr. Chandler.  1)Please take Tylenol 650 mg every 4 to 6 hours by mouth as needed for moderate to severe pain control.  Please do not exceed over 4,000 mg of Tylenol a day.  2)Please take Protonix 40 mg once a day by mouth. Follow up with Dr. Chandler in 1 week. Call the office at  to schedule your appointment. You may shower; soap and water over incision sites. Do not scrub. Pat dry when done. No tub bathing or swimming until cleared. Keep incision sites out of the sun as scars will darken. No heavy lifting (>10lbs) or strenuous exercise. Diet: Bariatric Full Fluids. 60 grams protein daily.  64 fluid ounces water daily. Drink small sips throughout the day. Continue diet as outlined by paperwork received as a pre-operative patient. You should be urinating at least 3-4x per day. Call the office if you experience increasing abdominal pain, nausea, vomiting, or temperature >100.4F.  NO ASPIRIN OR NSAIDs until approved by Dr. Chandler. Avoid alcoholic beverages until cleared by Dr. Chandler.  1)Please take Tylenol 650 mg every 4 to 6 hours by mouth as needed for moderate to severe pain control.  Please do not exceed over 4,000 mg of Tylenol a day.  2)Please take Protonix 40 mg once a day by mouth.

## 2020-07-28 NOTE — DIETITIAN INITIAL EVALUATION ADULT. - OTHER INFO
39F with hx s/p RYGB (, 2008) with recidivism, DM, HTN, PCOS admitting for elective revision robotic assisted gastric bypass (7/27), now POD #1. On assessment, pt resting in bed. Pt currently remains NPO at this time. S/p upper GI series this am. Pain and nausea well controlled. Discussed volumes of various cup sizes that will be on tray and encouraged aiming for 4 oz/hr as tolerated once diet is advanced. Prepared with protein shakes, and vitamins for after discharge. RD provided indepth edu on diet advancement and specific nutrient needs s/p revision of gastric bypass. NKFA. No dietary restrictions at home. Skin: surgical incisions. GI: WDL per flowsheet. RD to follow up per protocol. 39F with hx s/p RYGB (, 2008) with recidivism, DM, HTN, PCOS admitting for elective revision robotic assisted gastric bypass (7/27), now POD #1. On assessment, pt resting in bed. Pt currently remains NPO at this time. S/p upper GI series this am. Pain and nausea well controlled. Discussed volumes of various cup sizes that will be on tray and encouraged aiming for 4 oz/hr as tolerated once diet is advanced. Prepared with protein shakes, and vitamins for after discharge. RD provided indepth edu on diet advancement and specific nutrient needs s/p revision of gastric bypass. Allergic to shellfish. No dietary restrictions at home. Skin: surgical incisions. GI: WDL per flowsheet. RD to follow up per protocol.

## 2020-07-28 NOTE — DISCHARGE NOTE PROVIDER - NSDCCPGOAL_GEN_ALL_CORE_FT
To get better and follow your care plan as instructed.  1)Please take Tylenol 650 mg every 4 to 6 hours by mouth as needed for moderate to severe pain control.  Please do not exceed over 4,000 mg of Tylenol a day.  2)Please take Protonix 40 mg once a day by mouth.

## 2020-07-28 NOTE — DISCHARGE NOTE PROVIDER - CARE PROVIDER_API CALL
Guillaume Chandler A  SURGERY  [address] [id number]  Phone: [phone]  Fax: [phone]  Follow Up Time: 1 week

## 2020-07-28 NOTE — DIETITIAN INITIAL EVALUATION ADULT. - ENERGY NEEDS
Height: 66" Weight: 242lbs, IBW 130lbs+/-10%, %%, BMI 39.0 kg/m2  Above energy needs calculated for wt maintenance (20-25kcal/kg) using IBW (59kg)  Weeks 1-2 estimated needs: 590-710kcal/day (10-12kcal/kg), 71-89g pro/day (1.2-1.5g/kg), >/=64oz clear fluids.

## 2020-07-28 NOTE — DISCHARGE NOTE PROVIDER - NSDCMRMEDTOKEN_GEN_ALL_CORE_FT
Dexilant 60 mg oral delayed release capsule: 1 cap(s) orally once a day  hydroCHLOROthiazide 25 mg oral tablet: 1 tab(s) orally once a day  metFORMIN 500 mg oral tablet: 1 tab(s) orally 2 times a day  Vitamin D3 50,000 intl units (1250 mcg) oral capsule: orally once a week Protonix 40 mg oral delayed release tablet: 1 tab(s) orally once a day   Tylenol 325 mg oral tablet: 2 tab(s) orally every 6 hours, As Needed -for severe pain MDD:do not exceed 4000mg daily   Vitamin D3 50,000 intl units (1250 mcg) oral capsule: orally once a week

## 2020-07-28 NOTE — DISCHARGE NOTE PROVIDER - HOSPITAL COURSE
39 year old female with  history morbid obesity (BMI 40) DM, HTN, PCOS  , s/p RYGB (, 2008) with recidivism, presents for robotic assisted gastric bypass.  7/27: gastric bypass revision.  Pt tolerated the procedure well. Post-operatively, the pt's UGI was wnl. At time of discharge, pt was tolerating a bariatric clear liquid diet, and pt's pain was controlled. Plan is to follow up with Dr. Chandler  in the office.

## 2020-07-28 NOTE — PROGRESS NOTE ADULT - ASSESSMENT
39F PMHx morbid obesity (BMI 40) s/p RYGB (, 2008) with recidivism, DM, HTN, PCOS presents for robotic assisted gastric bypass revision (7/27) POD 1    Pain/Nausea Control PRN   BCLD, IVF LR @ 150cc/hr  Protonix  Shukla  SQL/SCDs  OOBA, IS   AM labs  Nutritional consult in AM

## 2020-07-28 NOTE — DISCHARGE NOTE PROVIDER - NSDCCPCAREPLAN_GEN_ALL_CORE_FT
PRINCIPAL DISCHARGE DIAGNOSIS  Diagnosis: Morbid obesity  Assessment and Plan of Treatment: 39 year old female with  history morbid obesity (BMI 40) DM, HTN, PCOS  , s/p RYGB (, 2008) with recidivism, presents for robotic assisted gastric bypass.  7/27: gastric bypass revision.  Pt tolerated the procedure well. Post-operatively, the pt's UGI was wnl. At time of discharge, pt was tolerating a bariatric clear liquid diet, and pt's pain was controlled. Plan is to follow up with Dr. Chandler  in the office.  1)Please take Tylenol 650 mg every 4 to 6 hours by mouth as needed for moderate to severe pain control.  Please do not exceed over 4,000 mg of Tylenol a day.  2)Please take Protonix 40 mg once a day by mouth.

## 2020-07-28 NOTE — PROGRESS NOTE ADULT - SUBJECTIVE AND OBJECTIVE BOX
SUBJECTIVE: Pt seen and examined by chief resident. Patient reports no acute events overnight. Tolerating diet without nausea or vomiting, ambulating out of bed to bathroom/hallway. Patient denies fever, chills, chest pain, or shortness of breathe.       MEDICATIONS  (STANDING):  acetaminophen   Tablet .. 1000 milliGRAM(s) Oral every 6 hours  dextrose 5%. 1000 milliLiter(s) (50 mL/Hr) IV Continuous <Continuous>  dextrose 50% Injectable 12.5 Gram(s) IV Push once  dextrose 50% Injectable 25 Gram(s) IV Push once  dextrose 50% Injectable 25 Gram(s) IV Push once  enoxaparin Injectable 40 milliGRAM(s) SubCutaneous two times a day  insulin lispro (HumaLOG) corrective regimen sliding scale   SubCutaneous Before meals and at bedtime  ketorolac   Injectable 15 milliGRAM(s) IV Push every 6 hours  lactated ringers. 1000 milliLiter(s) (150 mL/Hr) IV Continuous <Continuous>  pantoprazole  Injectable 40 milliGRAM(s) IV Push every 12 hours    MEDICATIONS  (PRN):  dextrose 40% Gel 15 Gram(s) Oral once PRN Blood Glucose LESS THAN 70 milliGRAM(s)/deciliter  glucagon  Injectable 1 milliGRAM(s) IntraMuscular once PRN Glucose LESS THAN 70 milligrams/deciliter  HYDROmorphone  Injectable 0.5 milliGRAM(s) IV Push every 4 hours PRN Severe Pain (7 - 10)  ondansetron Injectable 4 milliGRAM(s) IV Push every 6 hours PRN Nausea and/or Vomiting      Vital Signs Last 24 Hrs  T(C): 37.1 (28 Jul 2020 05:19), Max: 37.1 (28 Jul 2020 05:19)  T(F): 98.7 (28 Jul 2020 05:19), Max: 98.7 (28 Jul 2020 05:19)  HR: 99 (28 Jul 2020 05:19) (85 - 103)  BP: 117/80 (28 Jul 2020 05:19) (117/80 - 146/84)  BP(mean): 106 (27 Jul 2020 21:56) (89 - 108)  RR: 17 (28 Jul 2020 05:19) (15 - 26)  SpO2: 95% (28 Jul 2020 05:19) (93% - 100%)    Physical Exam:  GENERAL: NAD, Resting comfortably in bed  RESP: Nonlabored breathing, No respiratory distress  CARD: Normal rate, Normal peripheral perfusion  GI: Obese, soft, NT, ND, no guarding, no rebound tenderness, incisions are clean/dry without drainage/intact, no hematoma, WOODY with minimal clear SS output.   EXTREM: WWP, No edema, SCDs in place    I&O's Summary    27 Jul 2020 07:01  -  28 Jul 2020 07:00  --------------------------------------------------------  IN: 1490 mL / OUT: 1805 mL / NET: -315 mL        LABS:                        12.4   16.11 )-----------( 262      ( 27 Jul 2020 22:23 )             38.9               CAPILLARY BLOOD GLUCOSE      POCT Blood Glucose.: 173 mg/dL (27 Jul 2020 23:06)  POCT Blood Glucose.: 145 mg/dL (27 Jul 2020 19:46)

## 2020-07-29 ENCOUNTER — TRANSCRIPTION ENCOUNTER (OUTPATIENT)
Age: 40
End: 2020-07-29

## 2020-07-29 VITALS
OXYGEN SATURATION: 96 % | SYSTOLIC BLOOD PRESSURE: 117 MMHG | DIASTOLIC BLOOD PRESSURE: 81 MMHG | RESPIRATION RATE: 18 BRPM | TEMPERATURE: 98 F | HEART RATE: 71 BPM

## 2020-07-29 PROBLEM — I10 ESSENTIAL (PRIMARY) HYPERTENSION: Chronic | Status: ACTIVE | Noted: 2020-07-24

## 2020-07-29 PROBLEM — E66.01 MORBID (SEVERE) OBESITY DUE TO EXCESS CALORIES: Chronic | Status: ACTIVE | Noted: 2020-07-24

## 2020-07-29 PROBLEM — E11.9 TYPE 2 DIABETES MELLITUS WITHOUT COMPLICATIONS: Chronic | Status: ACTIVE | Noted: 2020-07-24

## 2020-07-29 PROBLEM — K21.9 GASTRO-ESOPHAGEAL REFLUX DISEASE WITHOUT ESOPHAGITIS: Chronic | Status: ACTIVE | Noted: 2020-07-24

## 2020-07-29 LAB
ANION GAP SERPL CALC-SCNC: 8 MMOL/L — SIGNIFICANT CHANGE UP (ref 5–17)
BUN SERPL-MCNC: 7 MG/DL — SIGNIFICANT CHANGE UP (ref 7–23)
CALCIUM SERPL-MCNC: 8.1 MG/DL — LOW (ref 8.4–10.5)
CHLORIDE SERPL-SCNC: 103 MMOL/L — SIGNIFICANT CHANGE UP (ref 96–108)
CO2 SERPL-SCNC: 27 MMOL/L — SIGNIFICANT CHANGE UP (ref 22–31)
CREAT SERPL-MCNC: 1.01 MG/DL — SIGNIFICANT CHANGE UP (ref 0.5–1.3)
GLUCOSE BLDC GLUCOMTR-MCNC: 97 MG/DL — SIGNIFICANT CHANGE UP (ref 70–99)
GLUCOSE SERPL-MCNC: 103 MG/DL — HIGH (ref 70–99)
HCT VFR BLD CALC: 33.5 % — LOW (ref 34.5–45)
HGB BLD-MCNC: 10.6 G/DL — LOW (ref 11.5–15.5)
MAGNESIUM SERPL-MCNC: 1.9 MG/DL — SIGNIFICANT CHANGE UP (ref 1.6–2.6)
MCHC RBC-ENTMCNC: 28.2 PG — SIGNIFICANT CHANGE UP (ref 27–34)
MCHC RBC-ENTMCNC: 31.6 GM/DL — LOW (ref 32–36)
MCV RBC AUTO: 89.1 FL — SIGNIFICANT CHANGE UP (ref 80–100)
NRBC # BLD: 0 /100 WBCS — SIGNIFICANT CHANGE UP (ref 0–0)
PHOSPHATE SERPL-MCNC: 2.1 MG/DL — LOW (ref 2.5–4.5)
PLATELET # BLD AUTO: 232 K/UL — SIGNIFICANT CHANGE UP (ref 150–400)
POTASSIUM SERPL-MCNC: 3.9 MMOL/L — SIGNIFICANT CHANGE UP (ref 3.5–5.3)
POTASSIUM SERPL-SCNC: 3.9 MMOL/L — SIGNIFICANT CHANGE UP (ref 3.5–5.3)
RBC # BLD: 3.76 M/UL — LOW (ref 3.8–5.2)
RBC # FLD: 12.7 % — SIGNIFICANT CHANGE UP (ref 10.3–14.5)
SODIUM SERPL-SCNC: 138 MMOL/L — SIGNIFICANT CHANGE UP (ref 135–145)
WBC # BLD: 9.41 K/UL — SIGNIFICANT CHANGE UP (ref 3.8–10.5)
WBC # FLD AUTO: 9.41 K/UL — SIGNIFICANT CHANGE UP (ref 3.8–10.5)

## 2020-07-29 RX ORDER — ACETAMINOPHEN 500 MG
2 TABLET ORAL
Qty: 240 | Refills: 0
Start: 2020-07-29 | End: 2020-08-27

## 2020-07-29 RX ORDER — PANTOPRAZOLE SODIUM 20 MG/1
1 TABLET, DELAYED RELEASE ORAL
Qty: 30 | Refills: 0
Start: 2020-07-29 | End: 2020-08-27

## 2020-07-29 RX ORDER — DEXLANSOPRAZOLE 30 MG/1
1 CAPSULE, DELAYED RELEASE ORAL
Qty: 0 | Refills: 0 | DISCHARGE

## 2020-07-29 RX ORDER — METFORMIN HYDROCHLORIDE 850 MG/1
1 TABLET ORAL
Qty: 0 | Refills: 0 | DISCHARGE

## 2020-07-29 RX ADMIN — SCOPALAMINE 1 PATCH: 1 PATCH, EXTENDED RELEASE TRANSDERMAL at 06:25

## 2020-07-29 RX ADMIN — Medication 15 MILLIGRAM(S): at 11:21

## 2020-07-29 RX ADMIN — Medication 1000 MILLIGRAM(S): at 06:42

## 2020-07-29 RX ADMIN — Medication 15 MILLIGRAM(S): at 11:50

## 2020-07-29 RX ADMIN — Medication 15 MILLIGRAM(S): at 05:42

## 2020-07-29 RX ADMIN — PANTOPRAZOLE SODIUM 40 MILLIGRAM(S): 20 TABLET, DELAYED RELEASE ORAL at 05:42

## 2020-07-29 RX ADMIN — Medication 15 MILLIGRAM(S): at 05:57

## 2020-07-29 RX ADMIN — Medication 62.5 MILLIMOLE(S): at 09:46

## 2020-07-29 RX ADMIN — Medication 1000 MILLIGRAM(S): at 00:35

## 2020-07-29 RX ADMIN — Medication 1000 MILLIGRAM(S): at 05:42

## 2020-07-29 RX ADMIN — ENOXAPARIN SODIUM 40 MILLIGRAM(S): 100 INJECTION SUBCUTANEOUS at 05:42

## 2020-07-29 NOTE — PROGRESS NOTE ADULT - SUBJECTIVE AND OBJECTIVE BOX
INTERVAL HPI/OVERNIGHT EVENTS: : pain controlled, cornelius diet, good urine o/p, OOBA   7/28: Shukla removed, passed TOV 200cc, NPO for UGI: delayed contrast mvmt, mild edema, no leak, adv to BCLD, cornelius BCLD -N/-V, VSS     STATUS POST:  Robotic assisted gastric bypass revision     POST OPERATIVE DAY #: 2    SUBJECTIVE: Patient examined bedside with chief resident. Patient reports pain controlled and tolerating BCLD. Patient ambulating and using incentive spirometer. Patient denies nausea, emesis, SOB and chest pain. Patient making adequate urine.        enoxaparin Injectable 40 milliGRAM(s) SubCutaneous two times a day      Vital Signs Last 24 Hrs  T(C): 36.6 (29 Jul 2020 04:59), Max: 37.5 (28 Jul 2020 09:11)  T(F): 97.8 (29 Jul 2020 04:59), Max: 99.5 (28 Jul 2020 09:11)  HR: 71 (29 Jul 2020 04:59) (71 - 91)  BP: 116/78 (29 Jul 2020 04:59) (108/71 - 132/85)  BP(mean): 101 (28 Jul 2020 16:00) (101 - 101)  RR: 18 (29 Jul 2020 04:59) (16 - 18)  SpO2: 96% (29 Jul 2020 04:59) (93% - 96%)  I&O's Detail    28 Jul 2020 07:01  -  29 Jul 2020 07:00  --------------------------------------------------------  IN:    IV PiggyBack: 100 mL    lactated ringers.: 1050 mL    lactated ringers.: 1050 mL    Oral Fluid: 400 mL  Total IN: 2600 mL    OUT:    Drain: 55 mL    Voided: 1600 mL  Total OUT: 1655 mL    Total NET: 945 mL          General: NAD, resting comfortably in bed  C/V: NSR  Pulm: Nonlabored breathing, no respiratory distress  Abd: Soft, non-distended, TTP around incision site, incision clean dry and intact   Extrem: WWP, no edema, SCDs in place        LABS:                        11.8   11.99 )-----------( 265      ( 28 Jul 2020 09:33 )             37.1     07-28    141  |  104  |  7   ----------------------------<  140<H>  4.2   |  27  |  1.06    Ca    8.7      28 Jul 2020 07:20  Phos  2.8     07-28  Mg     1.9     07-28

## 2020-07-29 NOTE — PROGRESS NOTE ADULT - ASSESSMENT
39F PMHx morbid obesity (BMI 40) s/p RYGB (, 2008) with recidivism, DM, HTN, PCOS presents for robotic assisted gastric bypass revision (7/27) POD     Pain/Nausea Control PRN   BCLD, IVF LR @ 75cc/hr  Protonix  SQL/SCDs  OOBA, IS   AM labs

## 2020-07-29 NOTE — DISCHARGE NOTE NURSING/CASE MANAGEMENT/SOCIAL WORK - PATIENT PORTAL LINK FT
You can access the FollowMyHealth Patient Portal offered by [facility] by registering at the following website: [email or web address]. By joining [facility]’s FollowMyHealth portal, you will also be able to view your health information using other applications (apps) compatible with our system.

## 2020-07-30 PROCEDURE — C1713: CPT

## 2020-07-30 PROCEDURE — 84100 ASSAY OF PHOSPHORUS: CPT

## 2020-07-30 PROCEDURE — 88307 TISSUE EXAM BY PATHOLOGIST: CPT

## 2020-07-30 PROCEDURE — 83036 HEMOGLOBIN GLYCOSYLATED A1C: CPT

## 2020-07-30 PROCEDURE — 83735 ASSAY OF MAGNESIUM: CPT

## 2020-07-30 PROCEDURE — 86901 BLOOD TYPING SEROLOGIC RH(D): CPT

## 2020-07-30 PROCEDURE — 85027 COMPLETE CBC AUTOMATED: CPT

## 2020-07-30 PROCEDURE — 36415 COLL VENOUS BLD VENIPUNCTURE: CPT

## 2020-07-30 PROCEDURE — C1889: CPT

## 2020-07-30 PROCEDURE — S2900: CPT

## 2020-07-30 PROCEDURE — 85025 COMPLETE CBC W/AUTO DIFF WBC: CPT

## 2020-07-30 PROCEDURE — 86850 RBC ANTIBODY SCREEN: CPT

## 2020-07-30 PROCEDURE — 82962 GLUCOSE BLOOD TEST: CPT

## 2020-07-30 PROCEDURE — 80048 BASIC METABOLIC PNL TOTAL CA: CPT

## 2020-07-30 PROCEDURE — 74240 X-RAY XM UPR GI TRC 1CNTRST: CPT

## 2020-08-02 DIAGNOSIS — K44.9 DIAPHRAGMATIC HERNIA WITHOUT OBSTRUCTION OR GANGRENE: ICD-10-CM

## 2020-08-02 DIAGNOSIS — E28.2 POLYCYSTIC OVARIAN SYNDROME: ICD-10-CM

## 2020-08-02 DIAGNOSIS — E66.01 MORBID (SEVERE) OBESITY DUE TO EXCESS CALORIES: ICD-10-CM

## 2020-08-02 DIAGNOSIS — I10 ESSENTIAL (PRIMARY) HYPERTENSION: ICD-10-CM

## 2020-08-02 DIAGNOSIS — E11.9 TYPE 2 DIABETES MELLITUS WITHOUT COMPLICATIONS: ICD-10-CM

## 2020-08-02 DIAGNOSIS — K21.9 GASTRO-ESOPHAGEAL REFLUX DISEASE WITHOUT ESOPHAGITIS: ICD-10-CM

## 2020-08-03 LAB — SURGICAL PATHOLOGY STUDY: SIGNIFICANT CHANGE UP

## 2020-08-04 ENCOUNTER — APPOINTMENT (OUTPATIENT)
Dept: SURGERY | Facility: CLINIC | Age: 40
End: 2020-08-04

## 2020-08-07 ENCOUNTER — APPOINTMENT (OUTPATIENT)
Dept: SURGERY | Facility: CLINIC | Age: 40
End: 2020-08-07
Payer: MEDICAID

## 2020-08-07 VITALS
SYSTOLIC BLOOD PRESSURE: 118 MMHG | HEIGHT: 66 IN | WEIGHT: 233.25 LBS | BODY MASS INDEX: 37.49 KG/M2 | OXYGEN SATURATION: 98 % | DIASTOLIC BLOOD PRESSURE: 83 MMHG | HEART RATE: 81 BPM | TEMPERATURE: 97.6 F

## 2020-08-07 PROCEDURE — 99024 POSTOP FOLLOW-UP VISIT: CPT

## 2020-08-07 NOTE — HISTORY OF PRESENT ILLNESS
[de-identified] : Pt is a 40 y/o F 1.5 weeks s/p revision bypass surgery who presents today feeling well. States she is having 2 protein shakes daily and yogurt, drinking water throughout the day. Reports walking every day for exercise. Denies fever, chest pn, sob, calf pain, n,v. States incisions are healing well. States she has lost 17 lbs since surgery. States she has had a bm since surgery. Pt taking vitamins daily as directed.

## 2020-08-28 ENCOUNTER — APPOINTMENT (OUTPATIENT)
Dept: SURGERY | Facility: CLINIC | Age: 40
End: 2020-08-28
Payer: MEDICAID

## 2020-08-28 VITALS
OXYGEN SATURATION: 98 % | TEMPERATURE: 97.3 F | SYSTOLIC BLOOD PRESSURE: 111 MMHG | BODY MASS INDEX: 35.77 KG/M2 | DIASTOLIC BLOOD PRESSURE: 73 MMHG | HEART RATE: 64 BPM | HEIGHT: 66 IN | WEIGHT: 222.56 LBS

## 2020-08-28 PROCEDURE — 99024 POSTOP FOLLOW-UP VISIT: CPT

## 2020-08-28 RX ORDER — IRON PS COMPLEX/B12/FOLIC ACID 150-25-1
150-25-1 CAPSULE ORAL
Qty: 30 | Refills: 6 | Status: ACTIVE | COMMUNITY
Start: 2020-08-28 | End: 1900-01-01

## 2020-08-28 RX ORDER — PANTOPRAZOLE 40 MG/1
40 TABLET, DELAYED RELEASE ORAL
Qty: 30 | Refills: 0 | Status: ACTIVE | COMMUNITY
Start: 2020-08-28 | End: 1900-01-01

## 2020-08-28 NOTE — HISTORY OF PRESENT ILLNESS
[de-identified] : 40 y/o F presents today for post op s/p revision bypass surgery 7/27/20. Pt is doing well overall and has lost 29 lb since the surgery. She denies abdominal pain, reflux,n,v,c,d. She has been tolerating her diet and reports eating protein shakes, scrambled eggs, soup, yogurt, cottage cheese. Pt is compliant with vitamins. She states the frequency of her BM has been increasing. Pt reports that she walks about an hour a day. She has been working from home.

## 2020-08-28 NOTE — ASSESSMENT
[FreeTextEntry1] : 38 y/o F presents today for post op s/p revision bypass surgery 7/27/20. Pt is doing well with 29 lb weight loss and no complaints. Incisions are healed and clean with no signs of infection. I advised her to take a fiber supplement such as Metamucil or Benefiber and to drink a lot of water to avoid constipation. I will prescribe iron. Patient will meet with nutrition to discuss dietary adjustment. Pt can increase her exercise. She will follow up here in two months.

## 2020-08-28 NOTE — END OF VISIT
[FreeTextEntry3] : All medical record entries made by the Scribe were at my, MARIALUISA Galaviz, discretion and personally dictated by me on 08/28/2020. I have reviewed the chart and agree that the record accurately reflects my personal performance of the history, physical exam, assessment and plan. I have also personally directed, reviewed and agreed to the chart.

## 2020-08-28 NOTE — PHYSICAL EXAM
[Obese] : obese [Normal] : affect appropriate [de-identified] : incisions well healed, clean, no signs of infection

## 2020-08-28 NOTE — ADDENDUM
[FreeTextEntry1] : This note was written by Natalie Robles on 08/28/2020 acting as scribe for MARIALUISA Galaviz.

## 2020-08-28 NOTE — PLAN
[FreeTextEntry1] : See nutrition \par Fiber supplement\par Rx iron and protonix refill\par Follow up 2 months

## 2020-10-23 ENCOUNTER — APPOINTMENT (OUTPATIENT)
Dept: SURGERY | Facility: CLINIC | Age: 40
End: 2020-10-23

## 2020-11-17 ENCOUNTER — APPOINTMENT (OUTPATIENT)
Dept: SURGERY | Facility: CLINIC | Age: 40
End: 2020-11-17

## 2020-12-01 ENCOUNTER — APPOINTMENT (OUTPATIENT)
Dept: SURGERY | Facility: CLINIC | Age: 40
End: 2020-12-01
Payer: MEDICAID

## 2020-12-01 VITALS
BODY MASS INDEX: 31.38 KG/M2 | WEIGHT: 195.25 LBS | HEIGHT: 66 IN | DIASTOLIC BLOOD PRESSURE: 81 MMHG | OXYGEN SATURATION: 98 % | SYSTOLIC BLOOD PRESSURE: 127 MMHG | HEART RATE: 88 BPM | TEMPERATURE: 97.2 F

## 2020-12-01 DIAGNOSIS — Z98.84 BARIATRIC SURGERY STATUS: ICD-10-CM

## 2020-12-01 PROCEDURE — 99213 OFFICE O/P EST LOW 20 MIN: CPT

## 2020-12-01 PROCEDURE — 99072 ADDL SUPL MATRL&STAF TM PHE: CPT

## 2020-12-01 NOTE — PHYSICAL EXAM
[Normal] : affect appropriate [de-identified] : normal respiration [de-identified] : umbilical hernia

## 2020-12-01 NOTE — ADDENDUM
[FreeTextEntry1] : This note was written by Natalie Robles on 12/01/2020 acting as scribe for Dr. Chandler

## 2020-12-01 NOTE — END OF VISIT
[FreeTextEntry3] : All medical record entries made by the Scribe were at my, Dr. Chandler's, discretion and personally dictated by me on 12/01/2020. I have reviewed the chart and agree that the record accurately reflects my personal performance of the history, physical exam, assessment and plan. I have also personally directed, reviewed and agreed to the chart.

## 2020-12-01 NOTE — HISTORY OF PRESENT ILLNESS
[de-identified] : Pt is a 39 y/o F who presents today for follow up almost 6 months s/p revision bypass 7/27/20. Pt is doing well overall. She is vitamin compliant. She states she would like to lose more weight but has had trouble finding time to exercise because her kids are doing at home schooling. She does report a bump in the abdomen and states she would like it evaluated because her friend who is a nurse told her it may be a hernia. She states the bump is not painful. Pt also reports that she is interested in an abdominoplasty to remove excess skin.

## 2020-12-01 NOTE — ASSESSMENT
[FreeTextEntry1] : Pt is a 39 y/o F s/p RYGB 2008 who presents today for follow up almost 6 months s/p revision bypass 7/27/20. Pt is doing well but has an umbilical hernia. I informed pt that the hernia will not resolve on its own and she will need a surgical repair. Pt has expressed interest in an abdominoplasty operation so I noted that she should wait about 6 more months before this operation so that her weight will stabilize and that it may be possible to do the umbilical hernia repair at the same time. I provided her with the names of a few plastic surgeons I recommend. If the hernia starts to be painful or bothersome, pt should contact my office and we will consider an earlier hernia repair. For now, pt will meet with our dietician for nutritional counseling. Will order blood work. Pt will follow up in 3 months.\par \par \par

## 2020-12-02 LAB
25(OH)D3 SERPL-MCNC: 48.9 NG/ML
ALBUMIN SERPL ELPH-MCNC: 4.6 G/DL
ALP BLD-CCNC: 112 U/L
ALT SERPL-CCNC: 23 U/L
ANION GAP SERPL CALC-SCNC: 12 MMOL/L
AST SERPL-CCNC: 20 U/L
BASOPHILS # BLD AUTO: 0.04 K/UL
BASOPHILS NFR BLD AUTO: 0.6 %
BILIRUB SERPL-MCNC: 0.6 MG/DL
BUN SERPL-MCNC: 8 MG/DL
CA-I SERPL-SCNC: 1.27 MMOL/L
CALCIUM SERPL-MCNC: 9.7 MG/DL
CALCIUM SERPL-MCNC: 9.7 MG/DL
CHLORIDE SERPL-SCNC: 102 MMOL/L
CHOLEST SERPL-MCNC: 153 MG/DL
CO2 SERPL-SCNC: 26 MMOL/L
CREAT SERPL-MCNC: 0.86 MG/DL
EOSINOPHIL # BLD AUTO: 0.23 K/UL
EOSINOPHIL NFR BLD AUTO: 3.2 %
FOLATE SERPL-MCNC: 14.8 NG/ML
GLUCOSE SERPL-MCNC: 86 MG/DL
HCT VFR BLD CALC: 42.3 %
HDLC SERPL-MCNC: 61 MG/DL
HGB BLD-MCNC: 13.3 G/DL
IMM GRANULOCYTES NFR BLD AUTO: 0.1 %
INR PPP: 0.99 RATIO
IRON SATN MFR SERPL: 18 %
IRON SERPL-MCNC: 72 UG/DL
LDLC SERPL CALC-MCNC: 73 MG/DL
LYMPHOCYTES # BLD AUTO: 1.85 K/UL
LYMPHOCYTES NFR BLD AUTO: 25.6 %
MAN DIFF?: NORMAL
MCHC RBC-ENTMCNC: 29.1 PG
MCHC RBC-ENTMCNC: 31.4 GM/DL
MCV RBC AUTO: 92.6 FL
MONOCYTES # BLD AUTO: 0.41 K/UL
MONOCYTES NFR BLD AUTO: 5.7 %
NEUTROPHILS # BLD AUTO: 4.7 K/UL
NEUTROPHILS NFR BLD AUTO: 64.8 %
NONHDLC SERPL-MCNC: 91 MG/DL
PARATHYROID HORMONE INTACT: 43 PG/ML
PLATELET # BLD AUTO: 301 K/UL
POTASSIUM SERPL-SCNC: 4.4 MMOL/L
PREALB SERPL NEPH-MCNC: 27 MG/DL
PROT SERPL-MCNC: 7.4 G/DL
PT BLD: 11.7 SEC
RBC # BLD: 4.57 M/UL
RBC # FLD: 13.3 %
SODIUM SERPL-SCNC: 140 MMOL/L
TIBC SERPL-MCNC: 388 UG/DL
TRIGL SERPL-MCNC: 93 MG/DL
TSH SERPL-ACNC: 0.77 UIU/ML
UIBC SERPL-MCNC: 317 UG/DL
VIT B12 SERPL-MCNC: 421 PG/ML
WBC # FLD AUTO: 7.24 K/UL

## 2020-12-05 LAB
A-TOCOPHEROL VIT E SERPL-MCNC: 5.4 MG/L
BETA+GAMMA TOCOPHEROL SERPL-MCNC: 0.9 MG/L
ZINC SERPL-MCNC: 79 UG/DL

## 2020-12-06 LAB
VIT A SERPL-MCNC: 45.6 UG/DL
VIT B1 SERPL-MCNC: 124.1 NMOL/L

## 2021-01-12 ENCOUNTER — APPOINTMENT (OUTPATIENT)
Dept: SURGERY | Facility: CLINIC | Age: 41
End: 2021-01-12

## 2021-01-15 ENCOUNTER — APPOINTMENT (OUTPATIENT)
Dept: SURGERY | Facility: CLINIC | Age: 41
End: 2021-01-15
Payer: MEDICAID

## 2021-01-15 VITALS
BODY MASS INDEX: 30.62 KG/M2 | OXYGEN SATURATION: 97 % | HEIGHT: 66 IN | HEART RATE: 71 BPM | SYSTOLIC BLOOD PRESSURE: 123 MMHG | TEMPERATURE: 97.1 F | WEIGHT: 190.5 LBS | DIASTOLIC BLOOD PRESSURE: 82 MMHG

## 2021-01-15 PROCEDURE — 99213 OFFICE O/P EST LOW 20 MIN: CPT

## 2021-01-15 PROCEDURE — 99072 ADDL SUPL MATRL&STAF TM PHE: CPT

## 2021-01-15 NOTE — PHYSICAL EXAM
[Normal] : affect appropriate [de-identified] : normal respiration [de-identified] : +umbilical hernia +incisions well healed with no signs of infection

## 2021-01-15 NOTE — ASSESSMENT
[FreeTextEntry1] : Pt is a 41 y/o F who presents for post op s/p revision of RYGB and hiatal hernia repair 7/27/20. Doing well but with symptomatic umbilical hernia. Pt has lost over 50 lb since the revision operation and I think an umbilical hernia repair is reasonable at this time.  The risks, benefits, and alternatives to the proposed procedure were discussed with the patient and all questions were answered to their satisfaction. Will schedule umbilical hernia repair and obtain necessary medical clearance.

## 2021-01-15 NOTE — HISTORY OF PRESENT ILLNESS
[de-identified] : Pt is a 39 y/o F who presents for post op s/p revision of RYGB and hiatal hernia repair 7/27/20. She is doing generally well. Weight loss has been good. Pt continues to c/o an umbilical hernia, which she states is increasingly causing pain and discomfort. She would like to have the hernia repaired, if possible.\par

## 2021-01-15 NOTE — END OF VISIT
[FreeTextEntry3] : All medical record entries made by the Scribe were at my, Dr. Chandler's, discretion and personally dictated by me on 01/15/2021. I have reviewed the chart and agree that the record accurately reflects my personal performance of the history, physical exam, assessment and plan. I have also personally directed, reviewed and agreed to the chart.

## 2021-01-15 NOTE — ADDENDUM
[FreeTextEntry1] : This note was written by Natalie Robles on 01/15/2021 acting as scribe for Dr. Chandler

## 2021-02-07 ENCOUNTER — LABORATORY RESULT (OUTPATIENT)
Age: 41
End: 2021-02-07

## 2021-02-09 ENCOUNTER — TRANSCRIPTION ENCOUNTER (OUTPATIENT)
Age: 41
End: 2021-02-09

## 2021-02-10 ENCOUNTER — APPOINTMENT (OUTPATIENT)
Dept: SURGERY | Facility: CLINIC | Age: 41
End: 2021-02-10

## 2021-02-10 ENCOUNTER — RESULT REVIEW (OUTPATIENT)
Age: 41
End: 2021-02-10

## 2021-02-10 ENCOUNTER — OUTPATIENT (OUTPATIENT)
Dept: OUTPATIENT SERVICES | Facility: HOSPITAL | Age: 41
LOS: 1 days | Discharge: HOME | End: 2021-02-10
Payer: MEDICAID

## 2021-02-10 DIAGNOSIS — Z98.84 BARIATRIC SURGERY STATUS: Chronic | ICD-10-CM

## 2021-02-10 DIAGNOSIS — Z98.890 OTHER SPECIFIED POSTPROCEDURAL STATES: Chronic | ICD-10-CM

## 2021-02-10 DIAGNOSIS — Z90.89 ACQUIRED ABSENCE OF OTHER ORGANS: Chronic | ICD-10-CM

## 2021-02-10 LAB — GLUCOSE BLDC GLUCOMTR-MCNC: 79 MG/DL — SIGNIFICANT CHANGE UP (ref 70–99)

## 2021-02-10 PROCEDURE — 49505 PRP I/HERN INIT REDUC >5 YR: CPT | Mod: GC

## 2021-02-10 PROCEDURE — 88302 TISSUE EXAM BY PATHOLOGIST: CPT | Mod: 26

## 2021-02-10 RX ORDER — ACETAMINOPHEN WITH CODEINE 300MG-30MG
1 TABLET ORAL
Qty: 8 | Refills: 0
Start: 2021-02-10 | End: 2021-02-11

## 2021-02-12 LAB — SURGICAL PATHOLOGY STUDY: SIGNIFICANT CHANGE UP

## 2021-02-23 ENCOUNTER — APPOINTMENT (OUTPATIENT)
Dept: SURGERY | Facility: CLINIC | Age: 41
End: 2021-02-23

## 2021-03-02 ENCOUNTER — APPOINTMENT (OUTPATIENT)
Dept: SURGERY | Facility: CLINIC | Age: 41
End: 2021-03-02
Payer: MEDICAID

## 2021-03-02 VITALS
BODY MASS INDEX: 31.18 KG/M2 | DIASTOLIC BLOOD PRESSURE: 87 MMHG | TEMPERATURE: 97.6 F | HEART RATE: 60 BPM | OXYGEN SATURATION: 99 % | HEIGHT: 66 IN | SYSTOLIC BLOOD PRESSURE: 141 MMHG | WEIGHT: 194 LBS

## 2021-03-02 DIAGNOSIS — Z98.84 BARIATRIC SURGERY STATUS: ICD-10-CM

## 2021-03-02 DIAGNOSIS — K43.9 VENTRAL HERNIA W/OUT OBSTRUCTION OR GANGRENE: ICD-10-CM

## 2021-03-02 DIAGNOSIS — E11.9 TYPE 2 DIABETES MELLITUS W/OUT COMPLICATIONS: ICD-10-CM

## 2021-03-02 PROCEDURE — 99024 POSTOP FOLLOW-UP VISIT: CPT

## 2021-04-06 PROBLEM — K43.9 VENTRAL HERNIA: Status: ACTIVE | Noted: 2021-01-15

## 2021-04-06 PROBLEM — Z98.84 HISTORY OF ROUX-EN-Y GASTRIC BYPASS: Status: ACTIVE | Noted: 2020-08-07

## 2021-04-06 PROBLEM — E11.9 DIABETES MELLITUS: Status: ACTIVE | Noted: 2020-02-07

## 2022-09-15 NOTE — PROGRESS NOTE ADULT - PROVIDER SPECIALTY LIST ADULT
Bariatric Surgery Problem: Diabetes/Glucose Control  Goal: Glucose maintained within prescribed range  Description: INTERVENTIONS:  - Monitor Blood Glucose as ordered  - Assess for signs and symptoms of hyperglycemia and hypoglycemia  - Administer ordered medications to maintain glucose within target range  - Assess barriers to adequate nutritional intake and initiate nutrition consult as needed  - Instruct patient on self management of diabetes  Outcome: Progressing     Problem: Patient/Family Goals  Goal: Patient/Family Short Term Goal  Description: Patient's Short Term Goal: dc home    Interventions:   -  - See additional Care Plan goals for specific interventions  Outcome: Progressing     Problem: PAIN - ADULT  Goal: Verbalizes/displays adequate comfort level or patient's stated pain goal  Description: INTERVENTIONS:  - Encourage pt to monitor pain and request assistance  - Assess pain using appropriate pain scale  - Administer analgesics based on type and severity of pain and evaluate response  - Implement non-pharmacological measures as appropriate and evaluate response  - Consider cultural and social influences on pain and pain management  - Manage/alleviate anxiety  - Utilize distraction and/or relaxation techniques  - Monitor for opioid side effects  - Notify MD/LIP if interventions unsuccessful or patient reports new pain  - Anticipate increased pain with activity and pre-medicate as appropriate  Outcome: Progressing     Problem: SKIN/TISSUE INTEGRITY - ADULT  Goal: Oral mucous membranes remain intact  Description: INTERVENTIONS  - Assess oral mucosa and hygiene practices  - Implement preventative oral hygiene regimen  - Implement oral medicated treatments as ordered  Outcome: Progressing     Problem: CARDIOVASCULAR - ADULT  Goal: Maintains optimal cardiac output and hemodynamic stability  Description: INTERVENTIONS:  - Monitor vital signs, rhythm, and trends  - Monitor for bleeding, hypotension and signs of decreased cardiac output  - Evaluate effectiveness of vasoactive medications to optimize hemodynamic stability  - Monitor arterial and/or venous puncture sites for bleeding and/or hematoma  - Assess quality of pulses, skin color and temperature  - Assess for signs of decreased coronary artery perfusion - ex. Angina  - Evaluate fluid balance, assess for edema, trend weights  Outcome: Progressing     Problem: METABOLIC/FLUID AND ELECTROLYTES - ADULT  Goal: Glucose maintained within prescribed range  Description: INTERVENTIONS:  - Monitor Blood Glucose as ordered  - Assess for signs and symptoms of hyperglycemia and hypoglycemia  - Administer ordered medications to maintain glucose within target range  - Assess barriers to adequate nutritional intake and initiate nutrition consult as needed  - Instruct patient on self management of diabetes  Outcome: Progressing   Patient has been npo this shift awaiting procedure. Patient has denied any c/o pain or nausea. Patient skin is still jaundice. patient family at bedside plan of care reviewed throughout this shift.

## 2023-07-26 NOTE — PRE-OP CHECKLIST - WARM FLUIDS/WARM BLANKETS
Neurosurgery    Called today at 8:15 and responded at 8:50. Reviewed films at 9:00 and arrived to see patient around 9:30.    9 year old who had a fall from a golf cart 6 days ago. Was a little lethargic for the first few days with headache, but completely asymptomatic now. Head CT shows significant epidural hematoma, 1.7 cm in greatest thickness without a lot of mass effect.   Child has no headache, nausea, vomiting, and is completely with it.     Under normal circumstances surgical intervention would be first choice, but since this happened over 4 days ago and she is improved and currently asymptomatic, I think close observation in ICU warranted. Discussed with family and spent over 60 in review of films, examination of patient and outside consultation.    Full consult to follow.  Suggest ICU observation with q 2 hour neuro checks,  CT of the head in the am.     no

## 2024-10-07 NOTE — H&P ADULT - REASON FOR ADMISSION
Patient: Lidia Jenkins    Procedure: Procedure(s):  RIGHT EYE VITRECTOMY, PARS PLANA APPROACH, USING 25-GAUGE INSTRUMENTS, MEMBRANE PEEL, AIR FLUID EXCHANGE       Diagnosis: Epiretinal membrane (ERM) of right eye [H35.371]  Diagnosis Additional Information: No value filed.    Anesthesia Type:   MAC     Note:    Oropharynx: oropharynx clear of all foreign objects and spontaneously breathing  Level of Consciousness: awake  Oxygen Supplementation: room air    Independent Airway: airway patency satisfactory and stable  Dentition: dentition unchanged  Vital Signs Stable: post-procedure vital signs reviewed and stable  Report to RN Given: handoff report given  Patient transferred to: Phase II  Comments: Uneventful transport   Report to RN  Exchanging well; color natl  Pt responds appropriately to command  IV patent  Lips/teeth/dentition as preop status  Questions answered      Handoff Report: Identifed the Patient, Identified the Reponsible Provider, Reviewed the pertinent medical history, Discussed the surgical course, Reviewed Intra-OP anesthesia mangement and issues during anesthesia, Set expectations for post-procedure period and Allowed opportunity for questions and acknowledgement of understanding      Vitals:  Vitals Value Taken Time   /75    Temp 97.7    Pulse 54    Resp 16    SpO2 98%        Electronically Signed By: TIFFANY SANDOVAL CRNA  October 7, 2024  10:16 AM   morbid obesity

## 2024-11-29 ENCOUNTER — INPATIENT (INPATIENT)
Facility: HOSPITAL | Age: 44
LOS: 1 days | Discharge: HOME | End: 2024-12-01
Attending: GENERAL ACUTE CARE HOSPITAL | Admitting: GENERAL ACUTE CARE HOSPITAL
Payer: COMMERCIAL

## 2024-11-29 VITALS
HEART RATE: 58 BPM | WEIGHT: 156.09 LBS | HEIGHT: 67 IN | TEMPERATURE: 97 F | DIASTOLIC BLOOD PRESSURE: 73 MMHG | OXYGEN SATURATION: 96 % | SYSTOLIC BLOOD PRESSURE: 112 MMHG | RESPIRATION RATE: 16 BRPM

## 2024-11-29 DIAGNOSIS — Z98.890 OTHER SPECIFIED POSTPROCEDURAL STATES: Chronic | ICD-10-CM

## 2024-11-29 DIAGNOSIS — Z98.84 BARIATRIC SURGERY STATUS: Chronic | ICD-10-CM

## 2024-11-29 DIAGNOSIS — Z90.89 ACQUIRED ABSENCE OF OTHER ORGANS: Chronic | ICD-10-CM

## 2024-11-29 LAB
ALBUMIN SERPL ELPH-MCNC: 3.9 G/DL — SIGNIFICANT CHANGE UP (ref 3.3–5)
ALP SERPL-CCNC: 55 U/L — SIGNIFICANT CHANGE UP (ref 40–120)
ALT FLD-CCNC: 16 U/L — SIGNIFICANT CHANGE UP (ref 10–45)
ANION GAP SERPL CALC-SCNC: 11 MMOL/L — SIGNIFICANT CHANGE UP (ref 5–17)
APPEARANCE UR: CLEAR — SIGNIFICANT CHANGE UP
APTT BLD: 25.3 SEC — SIGNIFICANT CHANGE UP (ref 24.5–35.6)
AST SERPL-CCNC: 25 U/L — SIGNIFICANT CHANGE UP (ref 10–40)
BASOPHILS # BLD AUTO: 0.02 K/UL — SIGNIFICANT CHANGE UP (ref 0–0.2)
BASOPHILS NFR BLD AUTO: 0.2 % — SIGNIFICANT CHANGE UP (ref 0–2)
BILIRUB SERPL-MCNC: 0.5 MG/DL — SIGNIFICANT CHANGE UP (ref 0.2–1.2)
BILIRUB UR-MCNC: NEGATIVE — SIGNIFICANT CHANGE UP
BLD GP AB SCN SERPL QL: NEGATIVE — SIGNIFICANT CHANGE UP
BUN SERPL-MCNC: 6 MG/DL — LOW (ref 7–23)
CALCIUM SERPL-MCNC: 7.7 MG/DL — LOW (ref 8.4–10.5)
CHLORIDE SERPL-SCNC: 105 MMOL/L — SIGNIFICANT CHANGE UP (ref 96–108)
CO2 SERPL-SCNC: 21 MMOL/L — LOW (ref 22–31)
COLOR SPEC: YELLOW — SIGNIFICANT CHANGE UP
CREAT SERPL-MCNC: 0.7 MG/DL — SIGNIFICANT CHANGE UP (ref 0.5–1.3)
DIFF PNL FLD: NEGATIVE — SIGNIFICANT CHANGE UP
EGFR: 110 ML/MIN/1.73M2 — SIGNIFICANT CHANGE UP
EOSINOPHIL # BLD AUTO: 0.01 K/UL — SIGNIFICANT CHANGE UP (ref 0–0.5)
EOSINOPHIL NFR BLD AUTO: 0.1 % — SIGNIFICANT CHANGE UP (ref 0–6)
GLUCOSE SERPL-MCNC: 104 MG/DL — HIGH (ref 70–99)
GLUCOSE UR QL: NEGATIVE MG/DL — SIGNIFICANT CHANGE UP
HCG SERPL-ACNC: <1 MIU/ML — SIGNIFICANT CHANGE UP
HCT VFR BLD CALC: 33.7 % — LOW (ref 34.5–45)
HGB BLD-MCNC: 11.1 G/DL — LOW (ref 11.5–15.5)
IMM GRANULOCYTES NFR BLD AUTO: 0.4 % — SIGNIFICANT CHANGE UP (ref 0–0.9)
INR BLD: 0.94 — SIGNIFICANT CHANGE UP (ref 0.85–1.16)
KETONES UR-MCNC: 80 MG/DL
LACTATE SERPL-SCNC: 0.6 MMOL/L — SIGNIFICANT CHANGE UP (ref 0.5–2)
LEUKOCYTE ESTERASE UR-ACNC: NEGATIVE — SIGNIFICANT CHANGE UP
LIDOCAIN IGE QN: 11 U/L — SIGNIFICANT CHANGE UP (ref 7–60)
LYMPHOCYTES # BLD AUTO: 1.15 K/UL — SIGNIFICANT CHANGE UP (ref 1–3.3)
LYMPHOCYTES # BLD AUTO: 11.7 % — LOW (ref 13–44)
MAGNESIUM SERPL-MCNC: 1.8 MG/DL — SIGNIFICANT CHANGE UP (ref 1.6–2.6)
MCHC RBC-ENTMCNC: 31.7 PG — SIGNIFICANT CHANGE UP (ref 27–34)
MCHC RBC-ENTMCNC: 32.9 G/DL — SIGNIFICANT CHANGE UP (ref 32–36)
MCV RBC AUTO: 96.3 FL — SIGNIFICANT CHANGE UP (ref 80–100)
MONOCYTES # BLD AUTO: 0.55 K/UL — SIGNIFICANT CHANGE UP (ref 0–0.9)
MONOCYTES NFR BLD AUTO: 5.6 % — SIGNIFICANT CHANGE UP (ref 2–14)
NEUTROPHILS # BLD AUTO: 8.02 K/UL — HIGH (ref 1.8–7.4)
NEUTROPHILS NFR BLD AUTO: 82 % — HIGH (ref 43–77)
NITRITE UR-MCNC: NEGATIVE — SIGNIFICANT CHANGE UP
NRBC # BLD: 0 /100 WBCS — SIGNIFICANT CHANGE UP (ref 0–0)
PH UR: 5.5 — SIGNIFICANT CHANGE UP (ref 5–8)
PLATELET # BLD AUTO: 240 K/UL — SIGNIFICANT CHANGE UP (ref 150–400)
POTASSIUM SERPL-MCNC: 4 MMOL/L — SIGNIFICANT CHANGE UP (ref 3.5–5.3)
POTASSIUM SERPL-SCNC: 4 MMOL/L — SIGNIFICANT CHANGE UP (ref 3.5–5.3)
PROT SERPL-MCNC: 6.1 G/DL — SIGNIFICANT CHANGE UP (ref 6–8.3)
PROT UR-MCNC: SIGNIFICANT CHANGE UP MG/DL
PROTHROM AB SERPL-ACNC: 11 SEC — SIGNIFICANT CHANGE UP (ref 9.9–13.4)
RBC # BLD: 3.5 M/UL — LOW (ref 3.8–5.2)
RBC # FLD: 11.7 % — SIGNIFICANT CHANGE UP (ref 10.3–14.5)
RH IG SCN BLD-IMP: POSITIVE — SIGNIFICANT CHANGE UP
RH IG SCN BLD-IMP: POSITIVE — SIGNIFICANT CHANGE UP
SODIUM SERPL-SCNC: 137 MMOL/L — SIGNIFICANT CHANGE UP (ref 135–145)
SP GR SPEC: >1.03 — HIGH (ref 1–1.03)
UROBILINOGEN FLD QL: 1 MG/DL — SIGNIFICANT CHANGE UP (ref 0.2–1)
WBC # BLD: 9.79 K/UL — SIGNIFICANT CHANGE UP (ref 3.8–10.5)
WBC # FLD AUTO: 9.79 K/UL — SIGNIFICANT CHANGE UP (ref 3.8–10.5)

## 2024-11-29 PROCEDURE — 99223 1ST HOSP IP/OBS HIGH 75: CPT | Mod: 57

## 2024-11-29 PROCEDURE — 99285 EMERGENCY DEPT VISIT HI MDM: CPT

## 2024-11-29 PROCEDURE — 44180 LAP ENTEROLYSIS: CPT | Mod: 22

## 2024-11-29 PROCEDURE — 71045 X-RAY EXAM CHEST 1 VIEW: CPT | Mod: 26

## 2024-11-29 RX ORDER — LANOLIN ALCOHOL/MO/W.PET/CERES
500 CREAM (GRAM) TOPICAL ONCE
Refills: 0 | Status: DISCONTINUED | OUTPATIENT
Start: 2024-11-29 | End: 2024-12-01

## 2024-11-29 RX ORDER — 0.9 % SODIUM CHLORIDE 0.9 %
1000 INTRAVENOUS SOLUTION INTRAVENOUS
Refills: 0 | Status: DISCONTINUED | OUTPATIENT
Start: 2024-11-29 | End: 2024-12-01

## 2024-11-29 RX ORDER — HEPARIN SODIUM,PORCINE 1000/ML
5000 VIAL (ML) INJECTION EVERY 8 HOURS
Refills: 0 | Status: DISCONTINUED | OUTPATIENT
Start: 2024-11-30 | End: 2024-12-01

## 2024-11-29 RX ORDER — HYDROMORPHONE HYDROCHLORIDE 2 MG/1
0.5 TABLET ORAL ONCE
Refills: 0 | Status: DISCONTINUED | OUTPATIENT
Start: 2024-11-29 | End: 2024-11-29

## 2024-11-29 RX ORDER — 0.9 % SODIUM CHLORIDE 0.9 %
1000 INTRAVENOUS SOLUTION INTRAVENOUS
Refills: 0 | Status: DISCONTINUED | OUTPATIENT
Start: 2024-11-29 | End: 2024-11-29

## 2024-11-29 RX ORDER — 0.9 % SODIUM CHLORIDE 0.9 %
1000 INTRAVENOUS SOLUTION INTRAVENOUS ONCE
Refills: 0 | Status: DISCONTINUED | OUTPATIENT
Start: 2024-11-29 | End: 2024-11-29

## 2024-11-29 RX ORDER — GLUCOSAMINE SULFATE DIPOT CHLR 500 MG
1 CAPSULE ORAL ONCE
Refills: 0 | Status: COMPLETED | OUTPATIENT
Start: 2024-11-29 | End: 2024-11-29

## 2024-11-29 RX ORDER — PANTOPRAZOLE SODIUM 40 MG/1
40 TABLET, DELAYED RELEASE ORAL DAILY
Refills: 0 | Status: DISCONTINUED | OUTPATIENT
Start: 2024-11-29 | End: 2024-12-01

## 2024-11-29 RX ORDER — ACETAMINOPHEN 500MG 500 MG/1
1000 TABLET, COATED ORAL EVERY 6 HOURS
Refills: 0 | Status: DISCONTINUED | OUTPATIENT
Start: 2024-11-29 | End: 2024-12-01

## 2024-11-29 RX ORDER — INFLUENZA VIRUS VACCINE 15; 15; 15; 15 UG/.5ML; UG/.5ML; UG/.5ML; UG/.5ML
0.5 SUSPENSION INTRAMUSCULAR ONCE
Refills: 0 | Status: DISCONTINUED | OUTPATIENT
Start: 2024-11-29 | End: 2024-12-01

## 2024-11-29 RX ORDER — HYDROMORPHONE HYDROCHLORIDE 2 MG/1
0.25 TABLET ORAL EVERY 4 HOURS
Refills: 0 | Status: DISCONTINUED | OUTPATIENT
Start: 2024-11-29 | End: 2024-11-29

## 2024-11-29 RX ORDER — HEPARIN SODIUM,PORCINE 1000/ML
5000 VIAL (ML) INJECTION EVERY 8 HOURS
Refills: 0 | Status: DISCONTINUED | OUTPATIENT
Start: 2024-11-29 | End: 2024-11-29

## 2024-11-29 RX ORDER — PANTOPRAZOLE SODIUM 40 MG/1
40 TABLET, DELAYED RELEASE ORAL DAILY
Refills: 0 | Status: DISCONTINUED | OUTPATIENT
Start: 2024-11-29 | End: 2024-11-29

## 2024-11-29 RX ORDER — ACETAMINOPHEN 500MG 500 MG/1
1000 TABLET, COATED ORAL EVERY 6 HOURS
Refills: 0 | Status: COMPLETED | OUTPATIENT
Start: 2024-11-29 | End: 2024-11-30

## 2024-11-29 RX ORDER — HYDROMORPHONE HYDROCHLORIDE 2 MG/1
0.5 TABLET ORAL ONCE
Refills: 0 | Status: DISCONTINUED | OUTPATIENT
Start: 2024-11-29 | End: 2024-12-01

## 2024-11-29 RX ORDER — HYDROMORPHONE HYDROCHLORIDE 2 MG/1
0.5 TABLET ORAL EVERY 4 HOURS
Refills: 0 | Status: DISCONTINUED | OUTPATIENT
Start: 2024-11-29 | End: 2024-11-29

## 2024-11-29 RX ORDER — ONDANSETRON HYDROCHLORIDE 4 MG/1
4 TABLET, FILM COATED ORAL EVERY 6 HOURS
Refills: 0 | Status: DISCONTINUED | OUTPATIENT
Start: 2024-11-29 | End: 2024-11-29

## 2024-11-29 RX ORDER — OXYCODONE HYDROCHLORIDE 30 MG/1
5 TABLET ORAL EVERY 6 HOURS
Refills: 0 | Status: DISCONTINUED | OUTPATIENT
Start: 2024-11-29 | End: 2024-12-01

## 2024-11-29 RX ORDER — ONDANSETRON HYDROCHLORIDE 4 MG/1
4 TABLET, FILM COATED ORAL EVERY 6 HOURS
Refills: 0 | Status: DISCONTINUED | OUTPATIENT
Start: 2024-11-29 | End: 2024-12-01

## 2024-11-29 RX ADMIN — ACETAMINOPHEN 500MG 400 MILLIGRAM(S): 500 TABLET, COATED ORAL at 21:33

## 2024-11-29 RX ADMIN — HYDROMORPHONE HYDROCHLORIDE 0.5 MILLIGRAM(S): 2 TABLET ORAL at 06:59

## 2024-11-29 RX ADMIN — Medication 100 MILLILITER(S): at 07:59

## 2024-11-29 RX ADMIN — Medication 110 MILLILITER(S): at 21:34

## 2024-11-29 RX ADMIN — Medication 100 GRAM(S): at 07:27

## 2024-11-29 NOTE — ED ADULT TRIAGE NOTE - ARRIVAL INFO ADDITIONAL COMMENTS
Per [facility] notes: CT consistent with small bowel obstruction with possible mesenteric artery involvement.

## 2024-11-29 NOTE — PRE-ANESTHESIA EVALUATION ADULT - NSDENTALSD_ENT_ALL_CORE
appears normal and intact Temporary cap L front tooth/appears normal and intact/caps/bridge/implants

## 2024-11-29 NOTE — ED PROVIDER NOTE - CLINICAL SUMMARY MEDICAL DECISION MAKING FREE TEXT BOX
abd pain, n/v, found to have SBO on CT at outside hospital. transferred here for surgical consult and intervention and her previous surgeries were here with Dr. Lundberg and Dr. Samuels  -check labs  -surgery consulted

## 2024-11-29 NOTE — BRIEF OPERATIVE NOTE - NSICDXBRIEFPROCEDURE_GEN_ALL_CORE_FT
PROCEDURES:  Diagnostic laparoscopy 29-Nov-2024 17:09:19 internal hernia reduction, closure of mesenteric defect Bhaskar Kessler

## 2024-11-29 NOTE — CONSULT NOTE ADULT - ASSESSMENT
43yo Female pt with PSH of RYGB and RA Revision of Gastric Bypass + HHR (,2020)(BMI 39 TO 24.4 Kg/m2) with complaints of periumbilical pain 10/10 and multiple episodes of emesis. On exam, Abdomen soft, mild TTP (under pain medication), mild distention, NG, NR, Well healed umbilical incision and surgical port sites.Excess skin from weight loss. Labs WNL, LA 0.  CTAP from OSH showing mesenteric swirling in the upper mid abdomen with dilated proximal small bowel loops, with contrast in non-dilated mid and distal small bowel loops; concerning for SBO, and free fluid in the pelvis. Patient admitted with concerns of internal hernia, with increased abdominal pain.    Admit to Tele under Dr. Ray  Pre-op labs  CXR  NPO  IVF  Pain  Nausea/vomiting  SQH  SCDs  IOs  OOB

## 2024-11-29 NOTE — PRE-ANESTHESIA EVALUATION ADULT - NSANTHPEFT_GEN_ALL_CORE
General: Appearance is consistent with chronological age. No abnormal facies.  EENT: Anicteric sclera; oropharynx clear, moist mucus membranes  Cardiovascular:  Rate and rhythm evaluated  Respiratory: Unlabored breathing  Neurological: Awake and alert, moves all extremities  Constitutional: METs>4 General: Appearance is consistent with chronological age. No abnormal facies.  Appears comfortable.   EENT: Anicteric sclera; oropharynx clear, moist mucus membranes  Cardiovascular:  Rate and rhythm evaluated  Respiratory: Unlabored breathing  Neurological: Awake and alert, moves all extremities  Constitutional: METs>4

## 2024-11-29 NOTE — CONSULT NOTE ADULT - SUBJECTIVE AND OBJECTIVE BOX
43yo Female pt with PMH of PCOS and PSH of RYGB, Tonsillectomy, RA Revision of Gastric Bypass + HHR (,2020)(BMI 39 TO 24.4 Kg/m2), Periumbilical incisional hernia repair (Dr. Escobedo, 02/2021). Patient went OSH with complaints of periumbilical pain 10/10 and multiple episodes of emesis. Patient refers episodes started 1 week ago while at work when started having nausea, followed by emesis of foamy clear fluid and thought it was related to food poisoning alongside abdominal pain; symptoms resolved the next day. She refers symptoms returned 2 days ago, with the periumbilical pain and emesis with same characteristics as before; patient refers doubling the dose of pantoprazole trying the pain better. Patient went overnight to OSH where she got worked up, and underwent CT AP showing mesenteric swirling in the upper mid abdomen with dilated proximal small bowel loops, with contrast in non-dilated mid and distal small bowel loops; concerning for SBO, and free fluid in the pelvis. [place] raising concern for possible internal herniation and sent to [facility]. Patient denies any symptoms of chest pain, shortness of breath, palpitations, changes in bowel movements, fatigue or weakness. And refers passing gas last time during yesterday.     In the ED, T 97.3F, HR 58, bp 112/73, and RR 16, sat O2 96%. On exam, patient hydrated with moist mucous membranes; Abdomen soft, mild TTP (under pain medication), mild distention, NG, NR, Well healed umbilical incision and surgical port sites.Excess skin from weight loss. Labs significant for WBC 9.79, Hgb 11.1, Plt 240, BUN/Cr 6/0.70, Lipase 11, LA 0.6. CTAP showing mesenteric swirling in the upper mid abdomen with dilated proximal small bowel loops, with contrast in non-dilated mid and distal small bowel loops; concerning for SBO, and free fluid in the pelvis.     PMH: PCOS, HTN (resolved), DM (resolved)  PSHx: RYGB (, 2008), Tonsillectomy (2013), RA Revision of Gastric Bypass + HHR (,2020), Periumbilical incisional hernia repair (Dr. Escobedo, 02/2021)  Medications: Vitamind D, Protonix 40mg  Allergies: NKDA, Shellfish  Family Hx: Denies family hx of IBS, Crohn's, UC, or colon cancer.  Last colonoscopy: 2022, f/up in 2 years (not sure on why)  Last EGD: 2022, 2 polyps, f/up in 2 years    T(C): 36.3 (11-29-24 @ 03:48), Max: 36.3 (11-29-24 @ 03:48)  HR: 58 (11-29-24 @ 03:48) (58 - 58)  BP: 112/73 (11-29-24 @ 03:48) (112/73 - 112/73)  RR: 16 (11-29-24 @ 03:48) (16 - 16)  SpO2: 96% (11-29-24 @ 03:48) (96% - 96%)    Physical Exam  General: AAOx3, NAD, laying comfortably in bed  Cardio: S1,S2, No MRG  Pulm: Nonlabored breathing  Abdomen: soft, mild TTP (under pain medication), mild distention, NG, NR. Well healed umbilical incision and surgical port sites. Excess skin from weight loss.   Extremities: WWP, peripheral pulses appreciated      LABS:                        11.1   9.79  )-----------( 240      ( 29 Nov 2024 03:55 )             33.7     11-29    137  |  105  |  6[L]  ----------------------------<  104[H]  4.0   |  21[L]  |  0.70    Ca    7.7[L]      29 Nov 2024 03:55  Mg     1.8     11-29    TPro  6.1  /  Alb  3.9  /  TBili  0.5  /  DBili  x   /  AST  25  /  ALT  16  /  AlkPhos  55  11-29    PT/INR - ( 29 Nov 2024 03:55 )   PT: 11.0 sec;   INR: 0.94          PTT - ( 29 Nov 2024 03:55 )  PTT:25.3 sec

## 2024-11-29 NOTE — ED ADULT NURSE NOTE - NSICDXPASTSURGICALHX_GEN_ALL_CORE_FT
PAST SURGICAL HISTORY:  H/O gastric bypass 2008    H/O ventral hernia repair     History of hysteroscopy     History of tonsillectomy 2003

## 2024-11-29 NOTE — ED ADULT NURSE NOTE - OBJECTIVE STATEMENT
Pt is a 44 year old female complaining of abdominal pain, bloating and nausea  since 2 days ago. Last bowel movement is yesterday. Pt went to [facility] and they did a CT scan of abdomen with finding of bowel obstruction. [facility] to [facility] as per pt's request and availability of bariatric surgeon. Hx of gastric bypass     Pt BIBEMS from previous hospital. Pt is awake, alert, and orintedx4/

## 2024-11-29 NOTE — BRIEF OPERATIVE NOTE - OPERATION/FINDINGS
Procedure: dx laparoscopy, reduction of internal hernia, and closure of mesenteric defect   Indication: Internal hernia through mesenteric defect at the revisional J-J anastomosis     Patient prepped and draped in sterile fashion. Veress for insufflation. Optiview 12mm trocar to the right of the umbilicus. Abdomen insufflated and inspected. Bowel appeared viable throughout. Gallbladder dilated however not acutely inflamed. Noted retrocolic jaspreet en y gastric bypass. Bowel was run from ligament of treitz to the J-J anastomosis to identify jaspreet limb, BP limb, and common channel. Noted first JJ anastomosis and then second JJ anastomosis from the revisional bypass. Bowel run from ileocecal valve to the anastomoses as well. Noted internal hernia defect at the site of the revisional (2nd) J-J anastomosis. Internal hernia reduced and mesenteric defect closed with 2-0 Quill suture in running fashion. 12mm port site closed with 0-0 Vicryl via jessica hilton needle passer. Abdomen desufflated and skin closed with monocryl.   Procedure: dx laparoscopy, reduction of internal hernia, and closure of mesenteric defect   Indication: Internal hernia through mesenteric defect at the revisional J-J anastomosis     Patient prepped and draped in sterile fashion. Veress for insufflation. Optiview 12mm trocar to the right of the umbilicus. Abdomen insufflated and inspected. Bowel appeared viable throughout. Noted milky white, lymphatic appearing ascites. Gallbladder dilated however not acutely inflamed. Noted retrocolic jaspreet en y gastric bypass. Bowel was run from ligament of treitz to the J-J anastomosis to identify jaspreet limb, BP limb, and common channel. Noted first JJ anastomosis and then second JJ anastomosis from the revisional bypass. Bowel run from ileocecal valve to the anastomoses as well. Noted internal hernia defect at the site of the revisional (2nd) J-J anastomosis. Internal hernia reduced and mesenteric defect closed with 2-0 Quill suture in running fashion. 12mm port site closed with 0-0 Vicryl via jessica hilton needle passer. Abdomen desufflated and skin closed with monocryl.

## 2024-11-29 NOTE — H&P ADULT - HISTORY OF PRESENT ILLNESS
45yo Female pt with PMH of PCOS and PSH of RYGB, Tonsillectomy, RA Revision of Gastric Bypass + HHR (,2020)(BMI 39 TO 24.4 Kg/m2), Periumbilical incisional hernia repair (Dr. Escobedo, 02/2021). Patient went OSH with complaints of periumbilical pain 10/10 and multiple episodes of emesis. Patient refers episodes started 1 week ago while at work when started having nausea, followed by emesis of foamy clear fluid and thought it was related to food poisoning alongside abdominal pain; symptoms resolved the next day. She refers symptoms returned 2 days ago, with the periumbilical pain and emesis with same characteristics as before; patient refers doubling the dose of pantoprazole trying the pain better. Patient went overnight to OSH where she got worked up, and underwent CT AP showing mesenteric swirling in the upper mid abdomen with dilated proximal small bowel loops, with contrast in non-dilated mid and distal small bowel loops; concerning for SBO, and free fluid in the pelvis. [place] raising concern for possible internal herniation and sent to [facility]. Patient denies any symptoms of chest pain, shortness of breath, palpitations, changes in bowel movements, fatigue or weakness. And refers passing gas last time during yesterday.     In the ED, T 97.3F, HR 58, bp 112/73, and RR 16, sat O2 96%. On exam, patient hydrated with moist mucous membranes; Abdomen soft, mild TTP (under pain medication), mild distention, NG, NR, Well healed umbilical incision and surgical port sites.Excess skin from weight loss. Labs significant for WBC 9.79, Hgb 11.1, Plt 240, BUN/Cr 6/0.70, Lipase 11, LA 0.6. CTAP showing mesenteric swirling in the upper mid abdomen with dilated proximal small bowel loops, with contrast in non-dilated mid and distal small bowel loops; concerning for SBO, and free fluid in the pelvis.     PMH: PCOS, HTN (resolved), DM (resolved)  PSHx: RYGB (, 2008), Tonsillectomy (2013), RA Revision of Gastric Bypass + HHR (,2020), Periumbilical incisional hernia repair (Dr. Escobedo, 02/2021)  Medications: Vitamind D, Protonix 40mg  Allergies: NKDA, Shellfish  Family Hx: Denies family hx of IBS, Crohn's, UC, or colon cancer.  Last colonoscopy: 2022, f/up in 2 years (not sure on why)  Last EGD: 2022, 2 polyps, f/up in 2 years    T(C): 36.3 (11-29-24 @ 03:48), Max: 36.3 (11-29-24 @ 03:48)  HR: 58 (11-29-24 @ 03:48) (58 - 58)  BP: 112/73 (11-29-24 @ 03:48) (112/73 - 112/73)  RR: 16 (11-29-24 @ 03:48) (16 - 16)  SpO2: 96% (11-29-24 @ 03:48) (96% - 96%)    Physical Exam  General: AAOx3, NAD, laying comfortably in bed  Cardio: S1,S2, No MRG  Pulm: Nonlabored breathing  Abdomen: soft, mild TTP (under pain medication), mild distention, NG, NR. Well healed umbilical incision and surgical port sites. Excess skin from weight loss.   Extremities: WWP, peripheral pulses appreciated      LABS:                        11.1   9.79  )-----------( 240      ( 29 Nov 2024 03:55 )             33.7     11-29    137  |  105  |  6[L]  ----------------------------<  104[H]  4.0   |  21[L]  |  0.70    Ca    7.7[L]      29 Nov 2024 03:55  Mg     1.8     11-29    TPro  6.1  /  Alb  3.9  /  TBili  0.5  /  DBili  x   /  AST  25  /  ALT  16  /  AlkPhos  55  11-29    PT/INR - ( 29 Nov 2024 03:55 )   PT: 11.0 sec;   INR: 0.94          PTT - ( 29 Nov 2024 03:55 )  PTT:25.3 sec

## 2024-11-29 NOTE — PRE-ANESTHESIA EVALUATION ADULT - NSANTHADDINFOFT_GEN_ALL_CORE
Risks, benefits and alternatives discussed; including but not limited to headache, nausea, bleeding, infection and local trauma/positioning related injury. All questions answered. The patient agrees to proceed.    Plan: GA, ALESHA Risks, benefits and alternatives discussed; including but not limited to headache, nausea, bleeding, infection and local trauma/positioning related injury. All questions answered. The patient agrees to proceed.    Plan: GA, PIV, RSI

## 2024-11-29 NOTE — PRE-ANESTHESIA EVALUATION ADULT - NSANTHOSAYNRD_GEN_A_CORE
No. CHANI screening performed.  STOP BANG Legend: 0-2 = LOW Risk; 3-4 = INTERMEDIATE Risk; 5-8 = HIGH Risk

## 2024-11-29 NOTE — ED ADULT NURSE REASSESSMENT NOTE - NS ED NURSE REASSESS COMMENT FT1
pt in pain, requesting pain medicine. surgery team paged. states will meet pt prior to putting in orders

## 2024-11-29 NOTE — BRIEF OPERATIVE NOTE - CONDITION POST OP
stable Billing Type: Third-Party Bill Bill For Surgical Tray: no Expected Date Of Service: 10/20/2021

## 2024-11-29 NOTE — PROGRESS NOTE ADULT - SUBJECTIVE AND OBJECTIVE BOX
Procedure: dx laparoscopy, reduction of internal hernia, and closure of mesenteric defect  Surgeon: Dr. Ray    S: Pt seen and examined at bedside. Patient is lying comfortably in bed with no complaints. Tolerating diet, pain well controlled with current regimen. Patient denies fever, nausea, vomiting, chest pain, and shortness of breath. Patient is not yet passing gas or having bowel movements. Patient has not voided yet.     O:  T(C): 36.9 (11-29-24 @ 18:09), Max: 36.9 (11-29-24 @ 18:09)  T(F): 98.4 (11-29-24 @ 18:09), Max: 98.4 (11-29-24 @ 18:09)  HR: 61 (11-29-24 @ 18:09) (56 - 61)  BP: 109/58 (11-29-24 @ 18:09) (105/70 - 112/71)  RR: 18 (11-29-24 @ 18:09) (15 - 18)  SpO2: 97% (11-29-24 @ 18:09) (96% - 99%)  Wt(kg): --                        11.1   9.79  )-----------( 240      ( 29 Nov 2024 03:55 )             33.7     11-29    137  |  105  |  6[L]  ----------------------------<  104[H]  4.0   |  21[L]  |  0.70    Ca    7.7[L]      29 Nov 2024 03:55  Mg     1.8     11-29    TPro  6.1  /  Alb  3.9  /  TBili  0.5  /  DBili  x   /  AST  25  /  ALT  16  /  AlkPhos  55  11-29      Physical Exam  General: Patient is doing well and lying in bed comfortably  Constitutional: alert and oriented   Pulm: Nonlabored breathing, no respiratory distress  CV: Regular rate and rhythm, normal sinus rhythm  Abd:  soft, TTP around incision sites and RLQ, mildly distended. No rebound, no guarding.             Incisions: clean, dry, intact, no erythema/induration/edema  Extremities: warm, well perfused, no edema  Drains:

## 2024-11-29 NOTE — PATIENT PROFILE ADULT - FALL HARM RISK - RISK INTERVENTIONS
Assistance OOB with selected safe patient handling equipment/Assistance with ambulation/Communicate Fall Risk and Risk Factors to all staff, patient, and family/Discuss with provider need for PT consult

## 2024-11-29 NOTE — ED PROVIDER NOTE - OBJECTIVE STATEMENT
44F hx gerd, pcos, s/p gastric bypass revision 2020, s/p ventral hernia repair 2021, c/o abd pain. pt states had abd pain, bloating and nausea/vomiting for past 4 days. no fevers. states last bowel movement yesterday. pt seen at [facility], and had CT showing SBO. pt transferred here as no bariatric surgeon at other hospital.

## 2024-11-30 LAB
ALBUMIN SERPL ELPH-MCNC: 3.6 G/DL — SIGNIFICANT CHANGE UP (ref 3.3–5)
ALP SERPL-CCNC: 53 U/L — SIGNIFICANT CHANGE UP (ref 40–120)
ALT FLD-CCNC: 12 U/L — SIGNIFICANT CHANGE UP (ref 10–45)
ANION GAP SERPL CALC-SCNC: 11 MMOL/L — SIGNIFICANT CHANGE UP (ref 5–17)
AST SERPL-CCNC: 17 U/L — SIGNIFICANT CHANGE UP (ref 10–40)
BASOPHILS # BLD AUTO: 0.02 K/UL — SIGNIFICANT CHANGE UP (ref 0–0.2)
BASOPHILS NFR BLD AUTO: 0.2 % — SIGNIFICANT CHANGE UP (ref 0–2)
BILIRUB SERPL-MCNC: 0.6 MG/DL — SIGNIFICANT CHANGE UP (ref 0.2–1.2)
BUN SERPL-MCNC: 4 MG/DL — LOW (ref 7–23)
CALCIUM SERPL-MCNC: 7.9 MG/DL — LOW (ref 8.4–10.5)
CHLORIDE SERPL-SCNC: 104 MMOL/L — SIGNIFICANT CHANGE UP (ref 96–108)
CO2 SERPL-SCNC: 22 MMOL/L — SIGNIFICANT CHANGE UP (ref 22–31)
CREAT SERPL-MCNC: 0.77 MG/DL — SIGNIFICANT CHANGE UP (ref 0.5–1.3)
EGFR: 98 ML/MIN/1.73M2 — SIGNIFICANT CHANGE UP
EOSINOPHIL # BLD AUTO: 0.01 K/UL — SIGNIFICANT CHANGE UP (ref 0–0.5)
EOSINOPHIL NFR BLD AUTO: 0.1 % — SIGNIFICANT CHANGE UP (ref 0–6)
GLUCOSE SERPL-MCNC: 74 MG/DL — SIGNIFICANT CHANGE UP (ref 70–99)
HCT VFR BLD CALC: 33.1 % — LOW (ref 34.5–45)
HGB BLD-MCNC: 10.6 G/DL — LOW (ref 11.5–15.5)
IMM GRANULOCYTES NFR BLD AUTO: 0.4 % — SIGNIFICANT CHANGE UP (ref 0–0.9)
LYMPHOCYTES # BLD AUTO: 1.86 K/UL — SIGNIFICANT CHANGE UP (ref 1–3.3)
LYMPHOCYTES # BLD AUTO: 16.3 % — SIGNIFICANT CHANGE UP (ref 13–44)
MAGNESIUM SERPL-MCNC: 1.7 MG/DL — SIGNIFICANT CHANGE UP (ref 1.6–2.6)
MCHC RBC-ENTMCNC: 31.5 PG — SIGNIFICANT CHANGE UP (ref 27–34)
MCHC RBC-ENTMCNC: 32 G/DL — SIGNIFICANT CHANGE UP (ref 32–36)
MCV RBC AUTO: 98.2 FL — SIGNIFICANT CHANGE UP (ref 80–100)
MONOCYTES # BLD AUTO: 0.8 K/UL — SIGNIFICANT CHANGE UP (ref 0–0.9)
MONOCYTES NFR BLD AUTO: 7 % — SIGNIFICANT CHANGE UP (ref 2–14)
NEUTROPHILS # BLD AUTO: 8.66 K/UL — HIGH (ref 1.8–7.4)
NEUTROPHILS NFR BLD AUTO: 76 % — SIGNIFICANT CHANGE UP (ref 43–77)
NRBC # BLD: 0 /100 WBCS — SIGNIFICANT CHANGE UP (ref 0–0)
PHOSPHATE SERPL-MCNC: 3.7 MG/DL — SIGNIFICANT CHANGE UP (ref 2.5–4.5)
PLATELET # BLD AUTO: 227 K/UL — SIGNIFICANT CHANGE UP (ref 150–400)
POTASSIUM SERPL-MCNC: 3.7 MMOL/L — SIGNIFICANT CHANGE UP (ref 3.5–5.3)
POTASSIUM SERPL-SCNC: 3.7 MMOL/L — SIGNIFICANT CHANGE UP (ref 3.5–5.3)
PROT SERPL-MCNC: 5.8 G/DL — LOW (ref 6–8.3)
RBC # BLD: 3.37 M/UL — LOW (ref 3.8–5.2)
RBC # FLD: 11.9 % — SIGNIFICANT CHANGE UP (ref 10.3–14.5)
SODIUM SERPL-SCNC: 137 MMOL/L — SIGNIFICANT CHANGE UP (ref 135–145)
WBC # BLD: 11.39 K/UL — HIGH (ref 3.8–10.5)
WBC # FLD AUTO: 11.39 K/UL — HIGH (ref 3.8–10.5)

## 2024-11-30 RX ORDER — POTASSIUM CHLORIDE 600 MG/1
20 TABLET, EXTENDED RELEASE ORAL ONCE
Refills: 0 | Status: COMPLETED | OUTPATIENT
Start: 2024-11-30 | End: 2024-11-30

## 2024-11-30 RX ORDER — KETOROLAC TROMETHAMINE 30 MG/ML
15 INJECTION INTRAMUSCULAR; INTRAVENOUS EVERY 6 HOURS
Refills: 0 | Status: DISCONTINUED | OUTPATIENT
Start: 2024-11-30 | End: 2024-12-01

## 2024-11-30 RX ADMIN — ACETAMINOPHEN 500MG 1000 MILLIGRAM(S): 500 TABLET, COATED ORAL at 13:24

## 2024-11-30 RX ADMIN — POTASSIUM CHLORIDE 20 MILLIEQUIVALENT(S): 600 TABLET, EXTENDED RELEASE ORAL at 09:11

## 2024-11-30 RX ADMIN — KETOROLAC TROMETHAMINE 15 MILLIGRAM(S): 30 INJECTION INTRAMUSCULAR; INTRAVENOUS at 19:32

## 2024-11-30 RX ADMIN — Medication 5000 UNIT(S): at 07:23

## 2024-11-30 RX ADMIN — ACETAMINOPHEN 500MG 1000 MILLIGRAM(S): 500 TABLET, COATED ORAL at 14:24

## 2024-11-30 RX ADMIN — ACETAMINOPHEN 500MG 400 MILLIGRAM(S): 500 TABLET, COATED ORAL at 05:18

## 2024-11-30 RX ADMIN — OXYCODONE HYDROCHLORIDE 5 MILLIGRAM(S): 30 TABLET ORAL at 09:40

## 2024-11-30 RX ADMIN — OXYCODONE HYDROCHLORIDE 5 MILLIGRAM(S): 30 TABLET ORAL at 09:10

## 2024-11-30 RX ADMIN — Medication 110 MILLILITER(S): at 12:00

## 2024-11-30 RX ADMIN — ACETAMINOPHEN 500MG 400 MILLIGRAM(S): 500 TABLET, COATED ORAL at 00:10

## 2024-11-30 RX ADMIN — ACETAMINOPHEN 500MG 1000 MILLIGRAM(S): 500 TABLET, COATED ORAL at 18:31

## 2024-11-30 RX ADMIN — KETOROLAC TROMETHAMINE 15 MILLIGRAM(S): 30 INJECTION INTRAMUSCULAR; INTRAVENOUS at 14:24

## 2024-11-30 RX ADMIN — Medication 100 GRAM(S): at 09:13

## 2024-11-30 RX ADMIN — Medication 5000 UNIT(S): at 00:10

## 2024-11-30 RX ADMIN — KETOROLAC TROMETHAMINE 15 MILLIGRAM(S): 30 INJECTION INTRAMUSCULAR; INTRAVENOUS at 13:19

## 2024-11-30 RX ADMIN — KETOROLAC TROMETHAMINE 15 MILLIGRAM(S): 30 INJECTION INTRAMUSCULAR; INTRAVENOUS at 18:32

## 2024-11-30 RX ADMIN — PANTOPRAZOLE SODIUM 40 MILLIGRAM(S): 40 TABLET, DELAYED RELEASE ORAL at 13:21

## 2024-11-30 RX ADMIN — Medication 5000 UNIT(S): at 17:00

## 2024-11-30 RX ADMIN — ACETAMINOPHEN 500MG 1000 MILLIGRAM(S): 500 TABLET, COATED ORAL at 19:32

## 2024-11-30 NOTE — PROGRESS NOTE ADULT - SUBJECTIVE AND OBJECTIVE BOX
INTERVAL HPI/OVERNIGHT EVENTS: POC WNL, pTOV with 350 CC, tolerating diet, -N/-V    STATUS POST:  11/29: dx laparoscopy, reduction of internal hernia, and closure of mesenteric defect. EBL 10cc, IVF 1500cc    POST OPERATIVE DAY #: 1    SUBJECTIVE: Pt seen and examined at bedside this am by surgery team. Endorses a little amount of pain without any nausea or vomiting. Tolerating CLD. -BF.    MEDICATIONS  (STANDING):  heparin   Injectable 5000 Unit(s) SubCutaneous every 8 hours  influenza   Vaccine 0.5 milliLiter(s) IntraMuscular once  lactated ringers. 1000 milliLiter(s) (110 mL/Hr) IV Continuous <Continuous>  pantoprazole  Injectable 40 milliGRAM(s) IV Push daily  thiamine IVPB 500 milliGRAM(s) IV Intermittent once    MEDICATIONS  (PRN):  acetaminophen   Oral Liquid .. 1000 milliGRAM(s) Oral every 6 hours PRN Mild Pain (1 - 3)  HYDROmorphone  Injectable 0.5 milliGRAM(s) IV Push once PRN BREAKTHROUGH PAIN, FOR PACU USE ONLY  ondansetron Injectable 4 milliGRAM(s) IV Push every 6 hours PRN Nausea and/or Vomiting  oxyCODONE    Solution 5 milliGRAM(s) Oral every 6 hours PRN Moderate Pain (4 - 6)      Vital Signs Last 24 Hrs  T(C): 37.2 (30 Nov 2024 05:00), Max: 37.2 (30 Nov 2024 05:00)  T(F): 98.9 (30 Nov 2024 05:00), Max: 98.9 (30 Nov 2024 05:00)  HR: 60 (30 Nov 2024 05:00) (56 - 90)  BP: 133/78 (30 Nov 2024 05:00) (105/70 - 159/79)  BP(mean): 84 (29 Nov 2024 17:34) (78 - 113)  RR: 18 (30 Nov 2024 05:00) (15 - 18)  SpO2: 96% (30 Nov 2024 05:00) (96% - 99%)    Parameters below as of 30 Nov 2024 05:00  Patient On (Oxygen Delivery Method): room air        PHYSICAL EXAM:    Constitutional: A&Ox3, nad  Respiratory: non labored breathing, no respiratory distress  Cardiovascular: NSR, RRR  Gastrointestinal: abd soft, mildly TTP, ND                 Incision: C/D/I  Genitourinary: voiding  Extremities: (-) edema, wwp, SCDs in place          I&O's Detail    29 Nov 2024 07:01  -  30 Nov 2024 07:00  --------------------------------------------------------  IN:    Lactated Ringers: 300 mL    Lactated Ringers: 1100 mL    Oral Fluid: 100 mL  Total IN: 1500 mL    OUT:    Voided (mL): 350 mL  Total OUT: 350 mL    Total NET: 1150 mL          LABS:                        10.6   11.39 )-----------( 227      ( 30 Nov 2024 07:10 )             33.1     11-30    137  |  104  |  4[L]  ----------------------------<  74  3.7   |  22  |  0.77    Ca    7.9[L]      30 Nov 2024 07:10  Phos  3.7     11-30  Mg     1.7     11-30    TPro  5.8[L]  /  Alb  3.6  /  TBili  0.6  /  DBili  x   /  AST  17  /  ALT  12  /  AlkPhos  53  11-30    PT/INR - ( 29 Nov 2024 03:55 )   PT: 11.0 sec;   INR: 0.94          PTT - ( 29 Nov 2024 03:55 )  PTT:25.3 sec  Urinalysis Basic - ( 30 Nov 2024 07:10 )    Color: x / Appearance: x / SG: x / pH: x  Gluc: 74 mg/dL / Ketone: x  / Bili: x / Urobili: x   Blood: x / Protein: x / Nitrite: x   Leuk Esterase: x / RBC: x / WBC x   Sq Epi: x / Non Sq Epi: x / Bacteria: x        RADIOLOGY & ADDITIONAL STUDIES:

## 2024-12-01 VITALS
SYSTOLIC BLOOD PRESSURE: 129 MMHG | RESPIRATION RATE: 18 BRPM | HEART RATE: 84 BPM | TEMPERATURE: 98 F | DIASTOLIC BLOOD PRESSURE: 88 MMHG | OXYGEN SATURATION: 99 %

## 2024-12-01 LAB
ALBUMIN SERPL ELPH-MCNC: 3.1 G/DL — LOW (ref 3.3–5)
ALP SERPL-CCNC: 44 U/L — SIGNIFICANT CHANGE UP (ref 40–120)
ALT FLD-CCNC: 12 U/L — SIGNIFICANT CHANGE UP (ref 10–45)
ANION GAP SERPL CALC-SCNC: 8 MMOL/L — SIGNIFICANT CHANGE UP (ref 5–17)
AST SERPL-CCNC: 17 U/L — SIGNIFICANT CHANGE UP (ref 10–40)
BASOPHILS # BLD AUTO: 0.03 K/UL — SIGNIFICANT CHANGE UP (ref 0–0.2)
BASOPHILS NFR BLD AUTO: 0.6 % — SIGNIFICANT CHANGE UP (ref 0–2)
BILIRUB SERPL-MCNC: 0.4 MG/DL — SIGNIFICANT CHANGE UP (ref 0.2–1.2)
BUN SERPL-MCNC: 3 MG/DL — LOW (ref 7–23)
CALCIUM SERPL-MCNC: 8.1 MG/DL — LOW (ref 8.4–10.5)
CHLORIDE SERPL-SCNC: 107 MMOL/L — SIGNIFICANT CHANGE UP (ref 96–108)
CO2 SERPL-SCNC: 26 MMOL/L — SIGNIFICANT CHANGE UP (ref 22–31)
CREAT SERPL-MCNC: 0.78 MG/DL — SIGNIFICANT CHANGE UP (ref 0.5–1.3)
EGFR: 97 ML/MIN/1.73M2 — SIGNIFICANT CHANGE UP
EOSINOPHIL # BLD AUTO: 0.11 K/UL — SIGNIFICANT CHANGE UP (ref 0–0.5)
EOSINOPHIL NFR BLD AUTO: 2.2 % — SIGNIFICANT CHANGE UP (ref 0–6)
GLUCOSE SERPL-MCNC: 72 MG/DL — SIGNIFICANT CHANGE UP (ref 70–99)
HCT VFR BLD CALC: 30.4 % — LOW (ref 34.5–45)
HGB BLD-MCNC: 10 G/DL — LOW (ref 11.5–15.5)
IMM GRANULOCYTES NFR BLD AUTO: 0.2 % — SIGNIFICANT CHANGE UP (ref 0–0.9)
LYMPHOCYTES # BLD AUTO: 2 K/UL — SIGNIFICANT CHANGE UP (ref 1–3.3)
LYMPHOCYTES # BLD AUTO: 40.7 % — SIGNIFICANT CHANGE UP (ref 13–44)
MAGNESIUM SERPL-MCNC: 1.9 MG/DL — SIGNIFICANT CHANGE UP (ref 1.6–2.6)
MCHC RBC-ENTMCNC: 32.9 G/DL — SIGNIFICANT CHANGE UP (ref 32–36)
MCHC RBC-ENTMCNC: 32.9 PG — SIGNIFICANT CHANGE UP (ref 27–34)
MCV RBC AUTO: 100 FL — SIGNIFICANT CHANGE UP (ref 80–100)
MONOCYTES # BLD AUTO: 0.39 K/UL — SIGNIFICANT CHANGE UP (ref 0–0.9)
MONOCYTES NFR BLD AUTO: 7.9 % — SIGNIFICANT CHANGE UP (ref 2–14)
NEUTROPHILS # BLD AUTO: 2.38 K/UL — SIGNIFICANT CHANGE UP (ref 1.8–7.4)
NEUTROPHILS NFR BLD AUTO: 48.4 % — SIGNIFICANT CHANGE UP (ref 43–77)
NRBC # BLD: 0 /100 WBCS — SIGNIFICANT CHANGE UP (ref 0–0)
PHOSPHATE SERPL-MCNC: 3 MG/DL — SIGNIFICANT CHANGE UP (ref 2.5–4.5)
PLATELET # BLD AUTO: 192 K/UL — SIGNIFICANT CHANGE UP (ref 150–400)
POTASSIUM SERPL-MCNC: 3.8 MMOL/L — SIGNIFICANT CHANGE UP (ref 3.5–5.3)
POTASSIUM SERPL-SCNC: 3.8 MMOL/L — SIGNIFICANT CHANGE UP (ref 3.5–5.3)
PROT SERPL-MCNC: 5.3 G/DL — LOW (ref 6–8.3)
RBC # BLD: 3.04 M/UL — LOW (ref 3.8–5.2)
RBC # FLD: 12 % — SIGNIFICANT CHANGE UP (ref 10.3–14.5)
SODIUM SERPL-SCNC: 141 MMOL/L — SIGNIFICANT CHANGE UP (ref 135–145)
WBC # BLD: 4.92 K/UL — SIGNIFICANT CHANGE UP (ref 3.8–10.5)
WBC # FLD AUTO: 4.92 K/UL — SIGNIFICANT CHANGE UP (ref 3.8–10.5)

## 2024-12-01 PROCEDURE — 84702 CHORIONIC GONADOTROPIN TEST: CPT

## 2024-12-01 PROCEDURE — 86900 BLOOD TYPING SEROLOGIC ABO: CPT

## 2024-12-01 PROCEDURE — 99285 EMERGENCY DEPT VISIT HI MDM: CPT | Mod: 25

## 2024-12-01 PROCEDURE — 84100 ASSAY OF PHOSPHORUS: CPT

## 2024-12-01 PROCEDURE — 85730 THROMBOPLASTIN TIME PARTIAL: CPT

## 2024-12-01 PROCEDURE — 81003 URINALYSIS AUTO W/O SCOPE: CPT

## 2024-12-01 PROCEDURE — 86850 RBC ANTIBODY SCREEN: CPT

## 2024-12-01 PROCEDURE — 85025 COMPLETE CBC W/AUTO DIFF WBC: CPT

## 2024-12-01 PROCEDURE — 83690 ASSAY OF LIPASE: CPT

## 2024-12-01 PROCEDURE — 36415 COLL VENOUS BLD VENIPUNCTURE: CPT

## 2024-12-01 PROCEDURE — 80053 COMPREHEN METABOLIC PANEL: CPT

## 2024-12-01 PROCEDURE — 86901 BLOOD TYPING SEROLOGIC RH(D): CPT

## 2024-12-01 PROCEDURE — 83605 ASSAY OF LACTIC ACID: CPT

## 2024-12-01 PROCEDURE — 71045 X-RAY EXAM CHEST 1 VIEW: CPT

## 2024-12-01 PROCEDURE — 83735 ASSAY OF MAGNESIUM: CPT

## 2024-12-01 PROCEDURE — 85610 PROTHROMBIN TIME: CPT

## 2024-12-01 RX ORDER — DOCUSATE SODIUM 100 MG
1 CAPSULE ORAL
Qty: 7 | Refills: 0
Start: 2024-12-01 | End: 2024-12-07

## 2024-12-01 RX ORDER — OXYCODONE HYDROCHLORIDE 30 MG/1
5 TABLET ORAL
Qty: 60 | Refills: 0
Start: 2024-12-01 | End: 2024-12-03

## 2024-12-01 RX ORDER — POTASSIUM CHLORIDE 600 MG/1
20 TABLET, EXTENDED RELEASE ORAL ONCE
Refills: 0 | Status: COMPLETED | OUTPATIENT
Start: 2024-12-01 | End: 2024-12-01

## 2024-12-01 RX ORDER — ACETAMINOPHEN 500MG 500 MG/1
20.3 TABLET, COATED ORAL
Qty: 324.8 | Refills: 0
Start: 2024-12-01 | End: 2024-12-04

## 2024-12-01 RX ORDER — ACETAMINOPHEN 500MG 500 MG/1
20 TABLET, COATED ORAL
Qty: 320 | Refills: 0
Start: 2024-12-01 | End: 2024-12-04

## 2024-12-01 RX ADMIN — KETOROLAC TROMETHAMINE 15 MILLIGRAM(S): 30 INJECTION INTRAMUSCULAR; INTRAVENOUS at 00:29

## 2024-12-01 RX ADMIN — ACETAMINOPHEN 500MG 1000 MILLIGRAM(S): 500 TABLET, COATED ORAL at 06:45

## 2024-12-01 RX ADMIN — Medication 5000 UNIT(S): at 00:30

## 2024-12-01 RX ADMIN — Medication 100 GRAM(S): at 11:17

## 2024-12-01 RX ADMIN — PANTOPRAZOLE SODIUM 40 MILLIGRAM(S): 40 TABLET, DELAYED RELEASE ORAL at 11:17

## 2024-12-01 RX ADMIN — Medication 5000 UNIT(S): at 07:10

## 2024-12-01 RX ADMIN — KETOROLAC TROMETHAMINE 15 MILLIGRAM(S): 30 INJECTION INTRAMUSCULAR; INTRAVENOUS at 11:17

## 2024-12-01 RX ADMIN — POTASSIUM CHLORIDE 20 MILLIEQUIVALENT(S): 600 TABLET, EXTENDED RELEASE ORAL at 11:17

## 2024-12-01 RX ADMIN — ACETAMINOPHEN 500MG 1000 MILLIGRAM(S): 500 TABLET, COATED ORAL at 07:33

## 2024-12-01 RX ADMIN — KETOROLAC TROMETHAMINE 15 MILLIGRAM(S): 30 INJECTION INTRAMUSCULAR; INTRAVENOUS at 06:46

## 2024-12-01 NOTE — DISCHARGE NOTE NURSING/CASE MANAGEMENT/SOCIAL WORK - NSDCPEFALRISK_GEN_ALL_CORE
For information on Fall & Injury Prevention, visit: [email or web address] OR  [email or web address] OR  https://www.cdc.gov/steadi/patient.html

## 2024-12-01 NOTE — DISCHARGE NOTE PROVIDER - NSDCCPTREATMENT_GEN_ALL_CORE_FT
PRINCIPAL PROCEDURE  Procedure: Diagnostic laparoscopy  Findings and Treatment: internal hernia reduction, closure of mesenteric defect

## 2024-12-01 NOTE — DISCHARGE NOTE NURSING/CASE MANAGEMENT/SOCIAL WORK - FINANCIAL ASSISTANCE
[facility] provides services at a reduced cost to those who are determined to be eligible through [facility]’s financial assistance program. Information regarding [facility]’s financial assistance program can be found by going to [email or web address] or by calling [phone].

## 2024-12-01 NOTE — DISCHARGE NOTE NURSING/CASE MANAGEMENT/SOCIAL WORK - PATIENT PORTAL LINK FT
You can access the FollowMyHealth Patient Portal offered by [facility] by registering at the following website: [email or web address]. By joining [facility]’s FollowMyHealth portal, you will also be able to view your health information using other applications (apps) compatible with our system.

## 2024-12-01 NOTE — DISCHARGE NOTE PROVIDER - NSDCMRMEDTOKEN_GEN_ALL_CORE_FT
Protonix 40 mg oral delayed release tablet: 1 tab(s) orally once a day   Vitamin D3 50,000 intl units (1250 mcg) oral capsule: orally once a week   Protonix 40 mg oral delayed release tablet: 1 tab(s) orally once a day    acetaminophen 650 mg/20.3 mL oral liquid: 20.3 milliliter(s) orally every 6 hours  Colace 100 mg oral capsule: 1 cap(s) orally once a day  oxyCODONE 5 mg/5 mL oral solution: 5 milliliter(s) orally every 6 hours as needed for Moderate Pain (4 - 6) MDD: 20 mL  Protonix 40 mg oral delayed release tablet: 1 tab(s) orally once a day    acetaminophen 160 mg/5 mL oral liquid: 20 milliliter(s) orally every 6 hours MDD: 80 mL  Colace 100 mg oral capsule: 1 cap(s) orally once a day  oxyCODONE 5 mg/5 mL oral solution: 5 milliliter(s) orally every 6 hours as needed for Moderate Pain (4 - 6) MDD: 20 mL  Protonix 40 mg oral delayed release tablet: 1 tab(s) orally once a day

## 2024-12-01 NOTE — DISCHARGE NOTE PROVIDER - INSTRUCTIONS
Please advance your diet from pureed to low fiber as tolerated. If nausea and vomiting occur, please stop and return back to pureed diet.

## 2024-12-01 NOTE — DISCHARGE NOTE NURSING/CASE MANAGEMENT/SOCIAL WORK - NSDCFUADDAPPT_GEN_ALL_CORE_FT
Please schedule a follow-up appointment with Dr. Ray within 1-2 weeks of discharge from the hospital. You may reach his office at [phone] at your earliest convenience.

## 2024-12-01 NOTE — PROGRESS NOTE ADULT - ASSESSMENT
43yo Female pt with PSH of RYGB and RA Revision of Gastric Bypass + HHR (,2020)(BMI 39 TO 24.4 Kg/m2), open umbilical hernia repair (2021; Geraldine) with complaints of periumbilical pain 10/10 and multiple episodes of emesis. On exam, Abdomen soft, mild TTP (under pain medication), mild distention, NG, NR, Well healed umbilical incision and surgical port sites.Excess skin from weight loss. Labs WNL, LA 0.  CTAP from OSH showing mesenteric swirling in the upper mid abdomen with dilated proximal small bowel loops, with contrast in non-dilated mid and distal small bowel loops; concerning for SBO, and free fluid in the pelvis. Patient admitted with concerns of internal hernia, with increased abdominal pain. Now s/p dx laparoscopy, reduction of internal hernia, and closure of mesenteric defect (11/29).     CLD/IVF  Pain/nausea control prn  PPI daily  SQH, SCDs  AM labs
45yo Female pt with PSH of RYGB and RA Revision of Gastric Bypass + HHR (,2020)(BMI 39 TO 24.4 Kg/m2), open umbilical hernia repair (2021; Geraldine) with complaints of periumbilical pain 10/10 and multiple episodes of emesis. On exam, Abdomen soft, mild TTP (under pain medication), mild distention, NG, NR, Well healed umbilical incision and surgical port sites.Excess skin from weight loss. Labs WNL, LA 0.  CTAP from OSH showing mesenteric swirling in the upper mid abdomen with dilated proximal small bowel loops, with contrast in non-dilated mid and distal small bowel loops; concerning for SBO, and free fluid in the pelvis. Patient admitted with concerns of internal hernia, with increased abdominal pain. Now s/p dx laparoscopy, reduction of internal hernia, and closure of mesenteric defect (11/29).     Tele  CLD/IVF  Pain/nausea control prn  PPI daily  SQH, SCDs  AM labs
45yo Female pt with PSH of RYGB and RA Revision of Gastric Bypass + HHR (,2020)(BMI 39 TO 24.4 Kg/m2), open umbilical hernia repair (2021; Geraldine) with complaints of periumbilical pain 10/10 and multiple episodes of emesis. On exam, Abdomen soft, mild TTP (under pain medication), mild distention, NG, NR, Well healed umbilical incision and surgical port sites.Excess skin from weight loss. Labs WNL, LA 0.  CTAP from OSH showing mesenteric swirling in the upper mid abdomen with dilated proximal small bowel loops, with contrast in non-dilated mid and distal small bowel loops; concerning for SBO, and free fluid in the pelvis. Patient admitted with concerns of internal hernia, with increased abdominal pain. Now s/p dx laparoscopy, reduction of internal hernia, and closure of mesenteric defect (11/29).     CLD/IVF  Pain/nausea control prn  PPI daily  SQH, SCDs  AM labs

## 2024-12-01 NOTE — DISCHARGE NOTE PROVIDER - HOSPITAL COURSE
Patient is a 43yo Female pt with PMH of PCOS and PSH of RYGB, Tonsillectomy, RA Revision of Gastric Bypass + HHR (,2020)(BMI 39 TO 24.4 Kg/m2), Periumbilical incisional hernia repair (Dr. Escobedo, 02/2021). Patient initially went OSH with complaints of periumbilical pain rated as a 10/10 and multiple episodes of emesis. Patient reported episodes started 1 week ago while at work. Episodes initiated by nausea and followed by emesis of foamy clear fluid. Symptoms resolved after one day but returned 2 days prior to presentation. At OSH, CTAP was done showing mesenteric swirling in the upper mid abdomen with dilated proximal small bowel loops, with contrast in non-dilated mid and distal small bowel loops. Findings were concerning for possible internal herniation and thus patient was sent to [facility]. Patient denies any symptoms of chest pain, SOB, palpitations, changes in bowel movements, fatigue or weakness.     In the ED, patient was afebrile, normotensive, non tachycardic and satting well on room air. On exam, abd was soft, mildly tender to palpation with mild distention. Labs were significant for WBC 9.79, Hgb 11.1, Plt 240, BUN/Cr 6/0.70, Lipase 11, LA 0.6. Patient was admitted to general surgery service on 11/20 and shortly taken to the OR for a diagnostic laparoscopy, reduction of internal hernia and closure of mesenteric defect. No perioperative complications were noted.     Diet was advanced as tolerated and per return of bowel function. At time of discharge pt is tolerating diet, pain well controlled, pt is ambulating/voiding freely, having adequate bowel function. Pt is hemodynamically normal/stable and medically ready for discharge with outpatient follow-up.

## 2024-12-01 NOTE — DISCHARGE NOTE PROVIDER - CARE PROVIDER_API CALL
Jasper Ray  Surgery  [address] [place] [id number]  Phone: [phone]  Fax: [phone]  Follow Up Time:

## 2024-12-01 NOTE — DISCHARGE NOTE PROVIDER - NSDCFUADDAPPT_GEN_ALL_CORE_FT
Please schedule a follow-up appointment with Dr. Ray within 1-2 weeks of discharge from the hospital. You may reach his office at [phone] at your earliest convenience.

## 2024-12-01 NOTE — PROGRESS NOTE ADULT - SUBJECTIVE AND OBJECTIVE BOX
INTERVAL HPI/OVERNIGHT EVENTS:    STATUS POST:      POST OPERATIVE DAY #:     SUBJECTIVE:      heparin   Injectable 5000 Unit(s) SubCutaneous every 8 hours      Vital Signs Last 24 Hrs  T(C): 36.8 (01 Dec 2024 05:21), Max: 36.8 (30 Nov 2024 20:20)  T(F): 98.2 (01 Dec 2024 05:21), Max: 98.2 (30 Nov 2024 20:20)  HR: 89 (01 Dec 2024 05:21) (65 - 89)  BP: 119/77 (01 Dec 2024 05:21) (118/78 - 148/91)  BP(mean): --  RR: 16 (01 Dec 2024 05:21) (16 - 18)  SpO2: 100% (01 Dec 2024 05:21) (96% - 100%)    Parameters below as of 01 Dec 2024 05:21  Patient On (Oxygen Delivery Method): room air      I&O's Detail    29 Nov 2024 07:01  -  30 Nov 2024 07:00  --------------------------------------------------------  IN:    Lactated Ringers: 300 mL    Lactated Ringers: 1100 mL    Oral Fluid: 100 mL  Total IN: 1500 mL    OUT:    Voided (mL): 350 mL  Total OUT: 350 mL    Total NET: 1150 mL      30 Nov 2024 07:01  -  01 Dec 2024 06:40  --------------------------------------------------------  IN:    Lactated Ringers: 1980 mL    Oral Fluid: 360 mL  Total IN: 2340 mL    OUT:    Voided (mL): 1350 mL  Total OUT: 1350 mL    Total NET: 990 mL          General: NAD, resting comfortably in bed  C/V: NSR  Pulm: Nonlabored breathing, no respiratory distress  Abd: soft, NT/ND.  Extrem: WWP, no edema, SCDs in place  Drains:  Gayla:      LABS:                        10.6   11.39 )-----------( 227      ( 30 Nov 2024 07:10 )             33.1     11-30    137  |  104  |  4[L]  ----------------------------<  74  3.7   |  22  |  0.77    Ca    7.9[L]      30 Nov 2024 07:10  Phos  3.7     11-30  Mg     1.7     11-30    TPro  5.8[L]  /  Alb  3.6  /  TBili  0.6  /  DBili  x   /  AST  17  /  ALT  12  /  AlkPhos  53  11-30      Urinalysis Basic - ( 30 Nov 2024 07:10 )    Color: x / Appearance: x / SG: x / pH: x  Gluc: 74 mg/dL / Ketone: x  / Bili: x / Urobili: x   Blood: x / Protein: x / Nitrite: x   Leuk Esterase: x / RBC: x / WBC x   Sq Epi: x / Non Sq Epi: x / Bacteria: x        RADIOLOGY & ADDITIONAL STUDIES:   INTERVAL HPI/OVERNIGHT EVENTS: ALAN    STATUS POST:  dx laparoscopy, reduction of internal hernia, and closure of mesenteric defect     POST OPERATIVE DAY #: 2    SUBJECTIVE: Patient seen at the bedside by the team today. Patient lying in bed resting comfortably in NAD. Pain is well controlled. Endorses flatus denies BM. Denies N/V. Ambulating well. Tolerating diet.       heparin   Injectable 5000 Unit(s) SubCutaneous every 8 hours      Vital Signs Last 24 Hrs  T(C): 36.8 (01 Dec 2024 05:21), Max: 36.8 (30 Nov 2024 20:20)  T(F): 98.2 (01 Dec 2024 05:21), Max: 98.2 (30 Nov 2024 20:20)  HR: 89 (01 Dec 2024 05:21) (65 - 89)  BP: 119/77 (01 Dec 2024 05:21) (118/78 - 148/91)  BP(mean): --  RR: 16 (01 Dec 2024 05:21) (16 - 18)  SpO2: 100% (01 Dec 2024 05:21) (96% - 100%)    Parameters below as of 01 Dec 2024 05:21  Patient On (Oxygen Delivery Method): room air      I&O's Detail    29 Nov 2024 07:01  -  30 Nov 2024 07:00  --------------------------------------------------------  IN:    Lactated Ringers: 300 mL    Lactated Ringers: 1100 mL    Oral Fluid: 100 mL  Total IN: 1500 mL    OUT:    Voided (mL): 350 mL  Total OUT: 350 mL    Total NET: 1150 mL      30 Nov 2024 07:01  -  01 Dec 2024 06:40  --------------------------------------------------------  IN:    Lactated Ringers: 1980 mL    Oral Fluid: 360 mL  Total IN: 2340 mL    OUT:    Voided (mL): 1350 mL  Total OUT: 1350 mL    Total NET: 990 mL          General: NAD, resting comfortably in bed  C/V: NSR  Pulm: Nonlabored breathing, no respiratory distress  Abd: soft, NT, mildly distended, no rebound or guarding, incisions C/D/I  Extrem: WWP, no edema, SCDs in place  Drains:  Gayla:      LABS:                        10.6   11.39 )-----------( 227      ( 30 Nov 2024 07:10 )             33.1     11-30    137  |  104  |  4[L]  ----------------------------<  74  3.7   |  22  |  0.77    Ca    7.9[L]      30 Nov 2024 07:10  Phos  3.7     11-30  Mg     1.7     11-30    TPro  5.8[L]  /  Alb  3.6  /  TBili  0.6  /  DBili  x   /  AST  17  /  ALT  12  /  AlkPhos  53  11-30      Urinalysis Basic - ( 30 Nov 2024 07:10 )    Color: x / Appearance: x / SG: x / pH: x  Gluc: 74 mg/dL / Ketone: x  / Bili: x / Urobili: x   Blood: x / Protein: x / Nitrite: x   Leuk Esterase: x / RBC: x / WBC x   Sq Epi: x / Non Sq Epi: x / Bacteria: x        RADIOLOGY & ADDITIONAL STUDIES:

## 2024-12-01 NOTE — DISCHARGE NOTE PROVIDER - NSDCFUADDINST_GEN_ALL_CORE_FT
Please take Tylenol 1000mg every 6 hours for pain. DO NOT exceed a max daily dose of 4 grams of tylenol. You may alternate every 3 hours with ibuprofen as needed.  Please take oxycodone 5mg every 6 hours as needed for breakthrough pain. Take colace 100mg daily while taking oxycodone to prevent constipation.    General Discharge Instructions:  Please resume all regular home medications unless specifically advised not to take a particular medication. Also, please take any new medications as prescribed.  Please get plenty of rest, continue to ambulate several times per day, and drink adequate amounts of fluids. Avoid lifting weights greater than 5-10 lbs until you follow-up with your surgeon, who will instruct you further regarding activity restrictions.  Avoid driving or operating heavy machinery while taking pain medications.  Please follow-up with your surgeon and Primary Care Provider (PCP) as advised.  Incision Care:  *Please call your doctor or nurse practitioner if you have increased pain, swelling, redness, or drainage from the incision site.  *Avoid swimming and baths until your follow-up appointment.  *You may shower, and wash surgical incisions with a mild soap and warm water. Gently pat the area dry.  *If you have staples, they will be removed at your follow-up appointment.  *If you have steri-strips, they will fall off on their own. Please remove any remaining strips 7-10 days after surgery.    Warning Signs:  Please call your doctor or nurse practitioner if you experience the following:  *You experience new chest pain, pressure, squeezing or tightness.  *New or worsening cough, shortness of breath, or wheeze.  *If you are vomiting and cannot keep down fluids or your medications.  *You are getting dehydrated due to continued vomiting, diarrhea, or other reasons. Signs of dehydration include dry mouth, rapid heartbeat, or feeling dizzy or faint when standing.  *You see blood or dark/black material when you vomit or have a bowel movement.  *You experience burning when you urinate, have blood in your urine, or experience a discharge.  *Your pain is not improving within 8-12 hours or is not gone within 24 hours. Call or return immediately if your pain is getting worse, changes location, or moves to your chest or back.  *You have shaking chills, or fever greater than 101.5 degrees Fahrenheit or 38 degrees Celsius.  *Any change in your symptoms, or any new symptoms that concern you.   Please take Tylenol 1000mg every 6 hours for pain. DO NOT exceed a max daily dose of 4 grams of tylenol. You may alternate every 3 hours with ibuprofen as needed.  Please take oxycodone 5mg every 6 hours as needed for breakthrough pain. Take colace 100mg daily while taking oxycodone to prevent constipation.  General Discharge Instructions:  Please resume all regular home medications unless specifically advised not to take a particular medication. Also, please take any new medications as prescribed.  Please get plenty of rest, continue to ambulate several times per day, and drink adequate amounts of fluids. Avoid lifting weights greater than 5-10 lbs until you follow-up with your surgeon, who will instruct you further regarding activity restrictions.  Avoid driving or operating heavy machinery while taking pain medications.  Please follow-up with your surgeon and Primary Care Provider (PCP) as advised.  Incision Care:  *Please call your doctor or nurse practitioner if you have increased pain, swelling, redness, or drainage from the incision site.  *Avoid swimming and baths until your follow-up appointment.  *You may shower, and wash surgical incisions with a mild soap and warm water. Gently pat the area dry.  *If you have staples, they will be removed at your follow-up appointment.  *If you have steri-strips, they will fall off on their own. Please remove any remaining strips 7-10 days after surgery.    Warning Signs:  Please call your doctor or nurse practitioner if you experience the following:  *You experience new chest pain, pressure, squeezing or tightness.  *New or worsening cough, shortness of breath, or wheeze.  *If you are vomiting and cannot keep down fluids or your medications.  *You are getting dehydrated due to continued vomiting, diarrhea, or other reasons. Signs of dehydration include dry mouth, rapid heartbeat, or feeling dizzy or faint when standing.  *You see blood or dark/black material when you vomit or have a bowel movement.  *You experience burning when you urinate, have blood in your urine, or experience a discharge.  *Your pain is not improving within 8-12 hours or is not gone within 24 hours. Call or return immediately if your pain is getting worse, changes location, or moves to your chest or back.  *You have shaking chills, or fever greater than 101.5 degrees Fahrenheit or 38 degrees Celsius.  *Any change in your symptoms, or any new symptoms that concern you.   Please take Tylenol 1000mg every 6 hours for pain. DO NOT exceed a max daily dose of 4 grams of tylenol. You may alternate every 3 hours with ibuprofen as needed.  Please take oxycodone 5 mL every 6 hours as needed for breakthrough pain. Do not exceed 20 mL over 24 hours. Take colace 100mg daily while taking oxycodone to prevent constipation.  General Discharge Instructions:  Please resume all regular home medications unless specifically advised not to take a particular medication. Also, please take any new medications as prescribed.  Please get plenty of rest, continue to ambulate several times per day, and drink adequate amounts of fluids. Avoid lifting weights greater than 5-10 lbs until you follow-up with your surgeon, who will instruct you further regarding activity restrictions.  Avoid driving or operating heavy machinery while taking pain medications.  Please follow-up with your surgeon and Primary Care Provider (PCP) as advised.  Incision Care:  *Please call your doctor or nurse practitioner if you have increased pain, swelling, redness, or drainage from the incision site.  *Avoid swimming and baths until your follow-up appointment.  *You may shower, and wash surgical incisions with a mild soap and warm water. Gently pat the area dry.  *If you have staples, they will be removed at your follow-up appointment.  *If you have steri-strips, they will fall off on their own. Please remove any remaining strips 7-10 days after surgery.    Warning Signs:  Please call your doctor or nurse practitioner if you experience the following:  *You experience new chest pain, pressure, squeezing or tightness.  *New or worsening cough, shortness of breath, or wheeze.  *If you are vomiting and cannot keep down fluids or your medications.  *You are getting dehydrated due to continued vomiting, diarrhea, or other reasons. Signs of dehydration include dry mouth, rapid heartbeat, or feeling dizzy or faint when standing.  *You see blood or dark/black material when you vomit or have a bowel movement.  *You experience burning when you urinate, have blood in your urine, or experience a discharge.  *Your pain is not improving within 8-12 hours or is not gone within 24 hours. Call or return immediately if your pain is getting worse, changes location, or moves to your chest or back.  *You have shaking chills, or fever greater than 101.5 degrees Fahrenheit or 38 degrees Celsius.  *Any change in your symptoms, or any new symptoms that concern you.

## 2024-12-01 NOTE — DISCHARGE NOTE PROVIDER - NSDCACTIVITY_GEN_ALL_CORE
Showering allowed/Stairs allowed/Walking - Indoors allowed/No heavy lifting/straining/Walking - Outdoors allowed/Follow Instructions Provided by your Surgical Team Showering allowed/Stairs allowed/Walking - Indoors allowed/No heavy lifting/straining/Walking - Outdoors allowed/Follow Instructions Provided by your Surgical Team/Activity as tolerated

## 2024-12-06 DIAGNOSIS — K21.9 GASTRO-ESOPHAGEAL REFLUX DISEASE WITHOUT ESOPHAGITIS: ICD-10-CM

## 2024-12-06 DIAGNOSIS — I10 ESSENTIAL (PRIMARY) HYPERTENSION: ICD-10-CM

## 2024-12-06 DIAGNOSIS — Z98.84 BARIATRIC SURGERY STATUS: ICD-10-CM

## 2024-12-06 DIAGNOSIS — K56.50 INTESTINAL ADHESIONS [BANDS], UNSPECIFIED AS TO PARTIAL VERSUS COMPLETE OBSTRUCTION: ICD-10-CM

## 2024-12-06 DIAGNOSIS — K56.609 UNSPECIFIED INTESTINAL OBSTRUCTION, UNSPECIFIED AS TO PARTIAL VERSUS COMPLETE OBSTRUCTION: ICD-10-CM

## 2024-12-06 DIAGNOSIS — E11.9 TYPE 2 DIABETES MELLITUS WITHOUT COMPLICATIONS: ICD-10-CM

## 2024-12-12 ENCOUNTER — APPOINTMENT (OUTPATIENT)
Dept: SURGERY | Facility: CLINIC | Age: 44
End: 2024-12-12
Payer: MEDICAID

## 2024-12-12 VITALS
BODY MASS INDEX: 22.82 KG/M2 | HEIGHT: 66 IN | HEART RATE: 67 BPM | DIASTOLIC BLOOD PRESSURE: 82 MMHG | SYSTOLIC BLOOD PRESSURE: 130 MMHG | WEIGHT: 142 LBS | OXYGEN SATURATION: 98 % | TEMPERATURE: 97.6 F

## 2024-12-12 DIAGNOSIS — K21.9 GASTRO-ESOPHAGEAL REFLUX DISEASE W/OUT ESOPHAGITIS: ICD-10-CM

## 2024-12-12 DIAGNOSIS — D64.9 ANEMIA, UNSPECIFIED: ICD-10-CM

## 2024-12-12 PROCEDURE — 99024 POSTOP FOLLOW-UP VISIT: CPT

## 2025-02-17 ENCOUNTER — INPATIENT (INPATIENT)
Facility: HOSPITAL | Age: 45
LOS: 2 days | Discharge: HOME | End: 2025-02-20
Attending: GENERAL ACUTE CARE HOSPITAL | Admitting: STUDENT IN AN ORGANIZED HEALTH CARE EDUCATION/TRAINING PROGRAM
Payer: COMMERCIAL

## 2025-02-17 VITALS
OXYGEN SATURATION: 97 % | TEMPERATURE: 99 F | SYSTOLIC BLOOD PRESSURE: 121 MMHG | HEART RATE: 73 BPM | DIASTOLIC BLOOD PRESSURE: 76 MMHG | RESPIRATION RATE: 18 BRPM

## 2025-02-17 DIAGNOSIS — Z98.84 BARIATRIC SURGERY STATUS: Chronic | ICD-10-CM

## 2025-02-17 DIAGNOSIS — Z90.89 ACQUIRED ABSENCE OF OTHER ORGANS: Chronic | ICD-10-CM

## 2025-02-17 DIAGNOSIS — Z98.890 OTHER SPECIFIED POSTPROCEDURAL STATES: Chronic | ICD-10-CM

## 2025-02-17 LAB
ANION GAP SERPL CALC-SCNC: 9 MMOL/L — SIGNIFICANT CHANGE UP (ref 5–17)
BLD GP AB SCN SERPL QL: NEGATIVE — SIGNIFICANT CHANGE UP
BUN SERPL-MCNC: 7 MG/DL — SIGNIFICANT CHANGE UP (ref 7–23)
CALCIUM SERPL-MCNC: 8.4 MG/DL — SIGNIFICANT CHANGE UP (ref 8.4–10.5)
CHLORIDE SERPL-SCNC: 106 MMOL/L — SIGNIFICANT CHANGE UP (ref 96–108)
CO2 SERPL-SCNC: 26 MMOL/L — SIGNIFICANT CHANGE UP (ref 22–31)
CREAT SERPL-MCNC: 0.78 MG/DL — SIGNIFICANT CHANGE UP (ref 0.5–1.3)
EGFR: 97 ML/MIN/1.73M2 — SIGNIFICANT CHANGE UP
GLUCOSE SERPL-MCNC: 74 MG/DL — SIGNIFICANT CHANGE UP (ref 70–99)
HCG UR QL: NEGATIVE — SIGNIFICANT CHANGE UP
HCT VFR BLD CALC: 35.3 % — SIGNIFICANT CHANGE UP (ref 34.5–45)
HGB BLD-MCNC: 11.6 G/DL — SIGNIFICANT CHANGE UP (ref 11.5–15.5)
LACTATE SERPL-SCNC: 0.6 MMOL/L — SIGNIFICANT CHANGE UP (ref 0.5–2)
MAGNESIUM SERPL-MCNC: 1.7 MG/DL — SIGNIFICANT CHANGE UP (ref 1.6–2.6)
MCHC RBC-ENTMCNC: 32 PG — SIGNIFICANT CHANGE UP (ref 27–34)
MCHC RBC-ENTMCNC: 32.9 G/DL — SIGNIFICANT CHANGE UP (ref 32–36)
MCV RBC AUTO: 97.2 FL — SIGNIFICANT CHANGE UP (ref 80–100)
NRBC BLD AUTO-RTO: 0 /100 WBCS — SIGNIFICANT CHANGE UP (ref 0–0)
PHOSPHATE SERPL-MCNC: 3.7 MG/DL — SIGNIFICANT CHANGE UP (ref 2.5–4.5)
PLATELET # BLD AUTO: 198 K/UL — SIGNIFICANT CHANGE UP (ref 150–400)
POTASSIUM SERPL-MCNC: 4 MMOL/L — SIGNIFICANT CHANGE UP (ref 3.5–5.3)
POTASSIUM SERPL-SCNC: 4 MMOL/L — SIGNIFICANT CHANGE UP (ref 3.5–5.3)
RBC # BLD: 3.63 M/UL — LOW (ref 3.8–5.2)
RBC # FLD: 12.5 % — SIGNIFICANT CHANGE UP (ref 10.3–14.5)
RH IG SCN BLD-IMP: POSITIVE — SIGNIFICANT CHANGE UP
SODIUM SERPL-SCNC: 141 MMOL/L — SIGNIFICANT CHANGE UP (ref 135–145)
WBC # BLD: 6.88 K/UL — SIGNIFICANT CHANGE UP (ref 3.8–10.5)
WBC # FLD AUTO: 6.88 K/UL — SIGNIFICANT CHANGE UP (ref 3.8–10.5)

## 2025-02-17 PROCEDURE — 99223 1ST HOSP IP/OBS HIGH 75: CPT | Mod: 57

## 2025-02-17 PROCEDURE — 74177 CT ABD & PELVIS W/CONTRAST: CPT | Mod: 26

## 2025-02-17 RX ORDER — HYDROMORPHONE/SOD CHLOR,ISO/PF 2 MG/10 ML
0.5 SYRINGE (ML) INJECTION EVERY 6 HOURS
Refills: 0 | Status: DISCONTINUED | OUTPATIENT
Start: 2025-02-17 | End: 2025-02-18

## 2025-02-17 RX ORDER — HEPARIN SODIUM 1000 [USP'U]/ML
5000 INJECTION INTRAVENOUS; SUBCUTANEOUS EVERY 8 HOURS
Refills: 0 | Status: DISCONTINUED | OUTPATIENT
Start: 2025-02-17 | End: 2025-02-18

## 2025-02-17 RX ORDER — DIATRIZOATE MEGLUMINE, SODIUM 66 %-10 %
30 VIAL (ML) INJECTION ONCE
Refills: 0 | Status: COMPLETED | OUTPATIENT
Start: 2025-02-17 | End: 2025-02-17

## 2025-02-17 RX ORDER — MAGNESIUM SULFATE 500 MG/ML
1 SYRINGE (ML) INJECTION ONCE
Refills: 0 | Status: COMPLETED | OUTPATIENT
Start: 2025-02-17 | End: 2025-02-17

## 2025-02-17 RX ORDER — ACETAMINOPHEN 500 MG/5ML
1000 LIQUID (ML) ORAL EVERY 6 HOURS
Refills: 0 | Status: DISCONTINUED | OUTPATIENT
Start: 2025-02-17 | End: 2025-02-18

## 2025-02-17 RX ORDER — INFLUENZA A VIRUS A/IDAHO/07/2018 (H1N1) ANTIGEN (MDCK CELL DERIVED, PROPIOLACTONE INACTIVATED, INFLUENZA A VIRUS A/INDIANA/08/2018 (H3N2) ANTIGEN (MDCK CELL DERIVED, PROPIOLACTONE INACTIVATED), INFLUENZA B VIRUS B/SINGAPORE/INFTT-16-0610/2016 ANTIGEN (MDCK CELL DERIVED, PROPIOLACTONE INACTIVATED), INFLUENZA B VIRUS B/IOWA/06/2017 ANTIGEN (MDCK CELL DERIVED, PROPIOLACTONE INACTIVATED) 15; 15; 15; 15 UG/.5ML; UG/.5ML; UG/.5ML; UG/.5ML
0.5 INJECTION, SUSPENSION INTRAMUSCULAR ONCE
Refills: 0 | Status: DISCONTINUED | OUTPATIENT
Start: 2025-02-17 | End: 2025-02-20

## 2025-02-17 RX ORDER — SODIUM CHLORIDE 9 G/1000ML
1000 INJECTION, SOLUTION INTRAVENOUS
Refills: 0 | Status: DISCONTINUED | OUTPATIENT
Start: 2025-02-17 | End: 2025-02-18

## 2025-02-17 RX ORDER — HYDROMORPHONE/SOD CHLOR,ISO/PF 2 MG/10 ML
0.2 SYRINGE (ML) INJECTION EVERY 6 HOURS
Refills: 0 | Status: DISCONTINUED | OUTPATIENT
Start: 2025-02-17 | End: 2025-02-18

## 2025-02-17 RX ORDER — ONDANSETRON HCL/PF 4 MG/2 ML
4 VIAL (ML) INJECTION EVERY 6 HOURS
Refills: 0 | Status: DISCONTINUED | OUTPATIENT
Start: 2025-02-17 | End: 2025-02-18

## 2025-02-17 RX ADMIN — Medication 4 MILLIGRAM(S): at 19:51

## 2025-02-17 RX ADMIN — SODIUM CHLORIDE 110 MILLILITER(S): 9 INJECTION, SOLUTION INTRAVENOUS at 11:04

## 2025-02-17 RX ADMIN — Medication 0.5 MILLIGRAM(S): at 21:39

## 2025-02-17 RX ADMIN — Medication 100 GRAM(S): at 16:45

## 2025-02-17 RX ADMIN — Medication 30 MILLILITER(S): at 11:04

## 2025-02-17 RX ADMIN — HEPARIN SODIUM 5000 UNIT(S): 1000 INJECTION INTRAVENOUS; SUBCUTANEOUS at 21:39

## 2025-02-17 RX ADMIN — Medication 400 MILLIGRAM(S): at 19:46

## 2025-02-17 NOTE — H&P ADULT - ASSESSMENT
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 Ray 11/29/24 presenting with 5 days abdominal bloating with associated nausea with continue bowel function. Patient does not have outside medical records from [place] however patient admitted for concern for partial SBO. Will plan to rescan for further management.    NPO/IVF  Pain/nausea control prn  SCDs/IS/OOBA/SQH  Stat labs  AM labs  Pending CT scan

## 2025-02-17 NOTE — H&P ADULT - NSHPPHYSICALEXAM_GEN_ALL_CORE
T(C): 37.2 (02-17-25 @ 08:51), Max: 37.2 (02-17-25 @ 08:51)  HR: 73 (02-17-25 @ 08:51) (73 - 73)  BP: 121/76 (02-17-25 @ 08:51) (121/76 - 121/76)  RR: 18 (02-17-25 @ 08:51) (18 - 18)  SpO2: 97% (02-17-25 @ 08:51) (97% - 97%)    Physical Exam  General: AAOx3, NAD, laying comfortably in bed  Cardio: S1,S2, No MRG  Pulm: Nonlabored breathing  Abdomen: Soft, mildly distended, TTP LLQ and epigastrium (-) rebound or guarding  Extremities: WWP, peripheral pulses appreciated

## 2025-02-17 NOTE — H&P ADULT - HISTORY OF PRESENT ILLNESS
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 Ray 11/29/24 presenting with 5 days abdominal bloating with associated nausea. She states she started experiencing pain on Sunday described as intermittent soreness rated 8/10 when at its worst. She took her home pantoprazole to relieve symptoms however had no relief. +flatus, last bm yesterday. She denies vomiting, bloody bms, urinary symptoms, chest pain, shortness of breath.    At OSH, pt afebrile, nontachycardic, normotensive, and satting on RA. On exam, soft, mildly distended, TTP LLQ and epigastrium. Labs significant for WBC 10 Hgb 13.3.     PMH: MO s/p RYGB, GERD  PSHx: Tonsillectomy, uterine polypectomy, RYGB with revision (2020), reduction of internal hernia (2024)  Medications: Pantoprazole 40mg QD  Allergies: Shellfish, NKDA  Family Hx: Denies family hx of IBS, Crohn's, UC, or colon cancer.  Last colonoscopy: 2022 with polypectomy  Last EGD: 2022

## 2025-02-17 NOTE — H&P ADULT - NSHPLABSRESULTS_GEN_ALL_CORE
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 Ray 11/29/24 presenting with 5 days abdominal bloating with associated nausea with continue bowel function. Patient does not have outside medical records from [place] however patient admitted for concern for partial SBO. Will plan to rescan for further management.    NPO/IVF  Pain/nausea control prn  SCDs/IS/OOBA  AM labs  Pending CT scan

## 2025-02-18 ENCOUNTER — TRANSCRIPTION ENCOUNTER (OUTPATIENT)
Age: 45
End: 2025-02-18

## 2025-02-18 LAB
ANION GAP SERPL CALC-SCNC: 11 MMOL/L — SIGNIFICANT CHANGE UP (ref 5–17)
APTT BLD: 29.7 SEC — SIGNIFICANT CHANGE UP (ref 24.5–35.6)
BLD GP AB SCN SERPL QL: NEGATIVE — SIGNIFICANT CHANGE UP
BUN SERPL-MCNC: 7 MG/DL — SIGNIFICANT CHANGE UP (ref 7–23)
CALCIUM SERPL-MCNC: 8 MG/DL — LOW (ref 8.4–10.5)
CHLORIDE SERPL-SCNC: 106 MMOL/L — SIGNIFICANT CHANGE UP (ref 96–108)
CO2 SERPL-SCNC: 20 MMOL/L — LOW (ref 22–31)
CREAT SERPL-MCNC: 0.72 MG/DL — SIGNIFICANT CHANGE UP (ref 0.5–1.3)
EGFR: 106 ML/MIN/1.73M2 — SIGNIFICANT CHANGE UP
GLUCOSE SERPL-MCNC: 59 MG/DL — LOW (ref 70–99)
HCT VFR BLD CALC: 33.3 % — LOW (ref 34.5–45)
HGB BLD-MCNC: 11.2 G/DL — LOW (ref 11.5–15.5)
INR BLD: 0.96 — SIGNIFICANT CHANGE UP (ref 0.85–1.16)
MAGNESIUM SERPL-MCNC: 1.6 MG/DL — SIGNIFICANT CHANGE UP (ref 1.6–2.6)
MCHC RBC-ENTMCNC: 33.3 PG — SIGNIFICANT CHANGE UP (ref 27–34)
MCHC RBC-ENTMCNC: 33.6 G/DL — SIGNIFICANT CHANGE UP (ref 32–36)
MCV RBC AUTO: 99.1 FL — SIGNIFICANT CHANGE UP (ref 80–100)
NRBC BLD AUTO-RTO: 0 /100 WBCS — SIGNIFICANT CHANGE UP (ref 0–0)
PHOSPHATE SERPL-MCNC: 3.2 MG/DL — SIGNIFICANT CHANGE UP (ref 2.5–4.5)
PLATELET # BLD AUTO: 188 K/UL — SIGNIFICANT CHANGE UP (ref 150–400)
POTASSIUM SERPL-MCNC: 4.1 MMOL/L — SIGNIFICANT CHANGE UP (ref 3.5–5.3)
POTASSIUM SERPL-SCNC: 4.1 MMOL/L — SIGNIFICANT CHANGE UP (ref 3.5–5.3)
PROTHROM AB SERPL-ACNC: 11 SEC — SIGNIFICANT CHANGE UP (ref 9.9–13.4)
RBC # BLD: 3.36 M/UL — LOW (ref 3.8–5.2)
RBC # FLD: 12.2 % — SIGNIFICANT CHANGE UP (ref 10.3–14.5)
RH IG SCN BLD-IMP: POSITIVE — SIGNIFICANT CHANGE UP
SODIUM SERPL-SCNC: 137 MMOL/L — SIGNIFICANT CHANGE UP (ref 135–145)
WBC # BLD: 8.27 K/UL — SIGNIFICANT CHANGE UP (ref 3.8–10.5)
WBC # FLD AUTO: 8.27 K/UL — SIGNIFICANT CHANGE UP (ref 3.8–10.5)

## 2025-02-18 PROCEDURE — 99233 SBSQ HOSP IP/OBS HIGH 50: CPT | Mod: 24

## 2025-02-18 PROCEDURE — 44180 LAP ENTEROLYSIS: CPT | Mod: 78,22

## 2025-02-18 RX ORDER — BUPIVACAINE 13.3 MG/ML
20 INJECTION, SUSPENSION, LIPOSOMAL INFILTRATION ONCE
Refills: 0 | Status: DISCONTINUED | OUTPATIENT
Start: 2025-02-18 | End: 2025-02-18

## 2025-02-18 RX ORDER — ACETAMINOPHEN 500 MG/5ML
650 LIQUID (ML) ORAL EVERY 6 HOURS
Refills: 0 | Status: DISCONTINUED | OUTPATIENT
Start: 2025-02-18 | End: 2025-02-19

## 2025-02-18 RX ORDER — MAGNESIUM SULFATE 500 MG/ML
2 SYRINGE (ML) INJECTION ONCE
Refills: 0 | Status: DISCONTINUED | OUTPATIENT
Start: 2025-02-18 | End: 2025-02-18

## 2025-02-18 RX ORDER — OXYCODONE HYDROCHLORIDE 30 MG/1
5 TABLET ORAL EVERY 6 HOURS
Refills: 0 | Status: DISCONTINUED | OUTPATIENT
Start: 2025-02-18 | End: 2025-02-19

## 2025-02-18 RX ORDER — DEXTROSE 50 % IN WATER 50 %
12.5 SYRINGE (ML) INTRAVENOUS ONCE
Refills: 0 | Status: DISCONTINUED | OUTPATIENT
Start: 2025-02-18 | End: 2025-02-18

## 2025-02-18 RX ORDER — HEPARIN SODIUM 1000 [USP'U]/ML
5000 INJECTION INTRAVENOUS; SUBCUTANEOUS EVERY 8 HOURS
Refills: 0 | Status: DISCONTINUED | OUTPATIENT
Start: 2025-02-19 | End: 2025-02-20

## 2025-02-18 RX ORDER — DEXTROSE 50 % IN WATER 50 %
25 SYRINGE (ML) INTRAVENOUS ONCE
Refills: 0 | Status: COMPLETED | OUTPATIENT
Start: 2025-02-18 | End: 2025-02-18

## 2025-02-18 RX ORDER — ACETAMINOPHEN 500 MG/5ML
1000 LIQUID (ML) ORAL ONCE
Refills: 0 | Status: DISCONTINUED | OUTPATIENT
Start: 2025-02-18 | End: 2025-02-19

## 2025-02-18 RX ORDER — OXYCODONE HYDROCHLORIDE 30 MG/1
2.5 TABLET ORAL EVERY 6 HOURS
Refills: 0 | Status: DISCONTINUED | OUTPATIENT
Start: 2025-02-18 | End: 2025-02-19

## 2025-02-18 RX ORDER — HYDROMORPHONE/SOD CHLOR,ISO/PF 2 MG/10 ML
0.2 SYRINGE (ML) INJECTION EVERY 6 HOURS
Refills: 0 | Status: DISCONTINUED | OUTPATIENT
Start: 2025-02-18 | End: 2025-02-20

## 2025-02-18 RX ORDER — MAGNESIUM SULFATE 500 MG/ML
2 SYRINGE (ML) INJECTION ONCE
Refills: 0 | Status: COMPLETED | OUTPATIENT
Start: 2025-02-18 | End: 2025-02-18

## 2025-02-18 RX ORDER — ONDANSETRON HCL/PF 4 MG/2 ML
4 VIAL (ML) INJECTION EVERY 6 HOURS
Refills: 0 | Status: DISCONTINUED | OUTPATIENT
Start: 2025-02-18 | End: 2025-02-20

## 2025-02-18 RX ORDER — SODIUM CHLORIDE 9 G/1000ML
1000 INJECTION, SOLUTION INTRAVENOUS
Refills: 0 | Status: DISCONTINUED | OUTPATIENT
Start: 2025-02-18 | End: 2025-02-19

## 2025-02-18 RX ADMIN — SODIUM CHLORIDE 110 MILLILITER(S): 9 INJECTION, SOLUTION INTRAVENOUS at 00:01

## 2025-02-18 RX ADMIN — OXYCODONE HYDROCHLORIDE 5 MILLIGRAM(S): 30 TABLET ORAL at 22:16

## 2025-02-18 RX ADMIN — SODIUM CHLORIDE 110 MILLILITER(S): 9 INJECTION, SOLUTION INTRAVENOUS at 21:09

## 2025-02-18 RX ADMIN — Medication 40 MILLIGRAM(S): at 12:15

## 2025-02-18 RX ADMIN — OXYCODONE HYDROCHLORIDE 5 MILLIGRAM(S): 30 TABLET ORAL at 23:16

## 2025-02-18 RX ADMIN — Medication 25 GRAM(S): at 12:14

## 2025-02-18 RX ADMIN — Medication 0.5 MILLIGRAM(S): at 06:20

## 2025-02-18 RX ADMIN — HEPARIN SODIUM 5000 UNIT(S): 1000 INJECTION INTRAVENOUS; SUBCUTANEOUS at 06:30

## 2025-02-18 RX ADMIN — Medication 0.5 MILLIGRAM(S): at 06:30

## 2025-02-18 RX ADMIN — Medication 0.5 MILLIGRAM(S): at 07:19

## 2025-02-18 RX ADMIN — SODIUM CHLORIDE 110 MILLILITER(S): 9 INJECTION, SOLUTION INTRAVENOUS at 06:30

## 2025-02-18 RX ADMIN — Medication 25 GRAM(S): at 12:47

## 2025-02-18 NOTE — PRE-ANESTHESIA EVALUATION ADULT - MALLAMPATI CLASS
Class I (easy) - visualization of the soft palate, fauces, uvula, and both anterior and posterior pillars
04-Nov-2024 08:57

## 2025-02-18 NOTE — DISCHARGE NOTE PROVIDER - NSDCCPCAREPLAN_GEN_ALL_CORE_FT
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 Ray 11/29/24, who presented with 5 days abdominal bloating with associated nausea. Pain first started on Sunday, described as intermittent soreness rated 8/10 at its worst. She took her home pantoprazole to relieve symptoms however had no relief. +flatus, last bm day prior to presentation. Denied vomiting, bloody bms, urinary symptoms, chest pain, and shortness of breath. At OSH, pt afebrile, nontachycardic, normotensive, and satting on RA. On exam, soft, mildly distended, TTP LLQ and epigastrium. Labs were significant for WBC 10 Hgb 13.3. CTAP with no evidence of SBO. Patient was admitted o general surgery service and taken to OR on 2/18 for diagnostic laparoscopy, reduction and repair of internal hernia at [facility] of [address] [place] [address]. No perioperative complications were noted. Diet was advanced as tolerated. At time of discharge pt is tolerating diet, pain well controlled, pt is ambulating/voiding freely, having adequate bowel function. Pt is hemodynamically normal/stable and medically ready for discharge with outpatient follow-up.  -Continue a regular diet  -Activity: no heavy lifting or strenuous exercise for one month.  -You may shower but no soaking baths, no swimming pools.  -Notify physician for fever greater than 101, worsening abdominal pain, bleeding or drainage from incision sites.   -Follow-up with Dr. Ray in 2 weeks. Call the office to make an appointment.

## 2025-02-18 NOTE — DISCHARGE NOTE PROVIDER - NSDCMRMEDTOKEN_GEN_ALL_CORE_FT
pantoprazole 40 mg oral delayed release tablet: 1 tab(s) orally once a day   pantoprazole 40 mg oral delayed release tablet: 1 tab(s) orally once a day  Rolling walker: Use for mobility related activity for daily living   pantoprazole 40 mg oral delayed release tablet: 1 tab(s) orally once a day  Rolling walker: ICD CODE  R53.1 Use for mobility related activity for daily living   acetaminophen 160 mg/5 mL oral liquid: 20 milliliter(s) orally every 6 hours as needed for -for moderate to severe pain MDD: 80 mL  pantoprazole 40 mg oral delayed release tablet: 1 tab(s) orally once a day  Rolling walker: ICD CODE  R53.1 Use for mobility related activity for daily living   acetaminophen 160 mg/5 mL oral liquid: 20 milliliter(s) orally every 6 hours as needed for -for moderate to severe pain MDD: 80 mL  oxyCODONE 5 mg/5 mL oral solution: 5 milliliter(s) orally every 6 hours as needed for  severe pain MDD: 20  pantoprazole 40 mg oral delayed release tablet: 1 tab(s) orally once a day MDD: 1  Rolling walker: ICD CODE  R53.1 Use for mobility related activity for daily living

## 2025-02-18 NOTE — DISCHARGE NOTE PROVIDER - CARE PROVIDER_API CALL
Jasper Ray  Surgery  [address] [place] [id number]  Phone: [phone]  Fax: [phone]  Follow Up Time:

## 2025-02-18 NOTE — DISCHARGE NOTE PROVIDER - NSDCFUADDINST_GEN_ALL_CORE_FT
Please take Tylenol 1000mg every 6 hours for pain. DO NOT exceed a max daily dose of 4 grams of tylenol. You may alternate every 3 hours with ibuprofen as needed.  Please take oxycodone 5mg every 6 hours as needed for breakthrough pain. Take colace 100mg daily while taking oxycodone to prevent constipation.    General Discharge Instructions:  Please resume all regular home medications unless specifically advised not to take a particular medication. Also, please take any new medications as prescribed.  Please get plenty of rest, continue to ambulate several times per day, and drink adequate amounts of fluids. Avoid lifting weights greater than 5-10 lbs until you follow-up with your surgeon, who will instruct you further regarding activity restrictions.  Avoid driving or operating heavy machinery while taking pain medications.  Please follow-up with your surgeon and Primary Care Provider (PCP) as advised.  Incision Care:  *Please call your doctor or nurse practitioner if you have increased pain, swelling, redness, or drainage from the incision site.  *Avoid swimming and baths until your follow-up appointment.  *You may shower, and wash surgical incisions with a mild soap and warm water. Gently pat the area dry.  *If you have staples, they will be removed at your follow-up appointment.  *If you have steri-strips, they will fall off on their own. Please remove any remaining strips 7-10 days after surgery.    Warning Signs:  Please call your doctor or nurse practitioner if you experience the following:  *You experience new chest pain, pressure, squeezing or tightness.  *New or worsening cough, shortness of breath, or wheeze.  *If you are vomiting and cannot keep down fluids or your medications.  *You are getting dehydrated due to continued vomiting, diarrhea, or other reasons. Signs of dehydration include dry mouth, rapid heartbeat, or feeling dizzy or faint when standing.  *You see blood or dark/black material when you vomit or have a bowel movement.  *You experience burning when you urinate, have blood in your urine, or experience a discharge.  *Your pain is not improving within 8-12 hours or is not gone within 24 hours. Call or return immediately if your pain is getting worse, changes location, or moves to your chest or back.  *You have shaking chills, or fever greater than 101.5 degrees Fahrenheit or 38 degrees Celsius.  *Any change in your symptoms, or any new symptoms that concern you.   Please take Tylenol 1000mg every 6 hours for pain. DO NOT exceed a max daily dose of 4 grams of Tylenol.   Please take oxycodone 5mg every 6 hours as needed for breakthrough pain. Take colace 100mg daily while taking oxycodone to prevent constipation.    General Discharge Instructions:  Please resume all regular home medications unless specifically advised not to take a particular medication. Also, please take any new medications as prescribed.  Please get plenty of rest, continue to ambulate several times per day, and drink adequate amounts of fluids. Avoid lifting weights greater than 5-10 lbs until you follow-up with your surgeon, who will instruct you further regarding activity restrictions.  Avoid driving or operating heavy machinery while taking pain medications.  Please follow-up with your surgeon and Primary Care Provider (PCP) as advised.  Incision Care:  *Please call your doctor or nurse practitioner if you have increased pain, swelling, redness, or drainage from the incision site.  *Avoid swimming and baths until your follow-up appointment.  *You may shower, and wash surgical incisions with a mild soap and warm water. Gently pat the area dry.    Warning Signs:  Please call your doctor or nurse practitioner if you experience the following:  *You experience new chest pain, pressure, squeezing or tightness.  *New or worsening cough, shortness of breath, or wheeze.  *If you are vomiting and cannot keep down fluids or your medications.  *You are getting dehydrated due to continued vomiting, diarrhea, or other reasons. Signs of dehydration include dry mouth, rapid heartbeat, or feeling dizzy or faint when standing.  *You see blood or dark/black material when you vomit or have a bowel movement.  *You experience burning when you urinate, have blood in your urine, or experience a discharge.  *Your pain is not improving within 8-12 hours or is not gone within 24 hours. Call or return immediately if your pain is getting worse, changes location, or moves to your chest or back.  *You have shaking chills, or fever greater than 101.5 degrees Fahrenheit or 38 degrees Celsius.  *Any change in your symptoms, or any new symptoms that concern you.

## 2025-02-18 NOTE — BRIEF OPERATIVE NOTE - OPERATION/FINDINGS
Procedure: Diagnostic laparoscopy, reduction of internal hernia, repair of mesenteric defect  Indication: Non obstructive internal hernia at the 2nd JJA mesenteries    Veress in LUQ, Optiview in L hemiabdomen. Noted chylous ascites upon entry. Noted segments of erythematous bowel, with obvious tangling, however viable throughout. Bowel run from TI along common channel with obvious internal hernia. Bowel then run from retrocolic jaspreet limb distally, noting first anastomosis from index RYGB, followed by 2nd anastomosis from the RYGB revision. Internal hernia was passing through large mesenteric defect between the mesenteries of the 2nd JJA. Bowel was reduced and defect closed with 2-0 Stratafix sutures in running fashion. Bowel run again in both directions with assessment of all 3 limbs, without abnormality. Abdomen desufflated and umbilical fascia closed with 0-0 Vicryl. skin closed with monocryl.

## 2025-02-18 NOTE — PROGRESS NOTE ADULT - ASSESSMENT
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 Ray 11/29/24 presenting with 5 days abdominal bloating with associated nausea with continue bowel function. Patient does not have outside medical records from [place] however patient admitted for concern for partial SBO w/ c/f internal hernia as cause. Will plan to rescan for further management.    NPO/IVF  Pain/nausea control prn  SCDs/IS/OOBA/SQH  AM labs

## 2025-02-18 NOTE — BRIEF OPERATIVE NOTE - NSICDXBRIEFPROCEDURE_GEN_ALL_CORE_FT
PROCEDURES:  Diagnostic laparoscopy 18-Feb-2025 18:45:18  Bhaskar Kessler  Repair, internal hernia, laparoscopic 18-Feb-2025 18:45:27  Bhaskar Kessler

## 2025-02-18 NOTE — DISCHARGE NOTE PROVIDER - NSDCFUADDAPPT_GEN_ALL_CORE_FT
Please schedule a follow-up appointment with Dr. Ray within 1-2 weeks of discharge from the hospital. You may reach his office at [phone] at your earliest convenience.

## 2025-02-18 NOTE — DISCHARGE NOTE PROVIDER - NSDCCPTREATMENT_GEN_ALL_CORE_FT
PRINCIPAL PROCEDURE  Procedure: Diagnostic laparoscopy  Findings and Treatment:       SECONDARY PROCEDURE  Procedure: Repair, internal hernia, laparoscopic  Findings and Treatment:

## 2025-02-18 NOTE — PROGRESS NOTE ADULT - SUBJECTIVE AND OBJECTIVE BOX
Procedure: 2/18: dx lap, reduction and repair of internal hernia at mesentery of [address] [place] [address]  Surgeon: Dr. Ray    S: Pt complaining of mild abdominal pain. Denies any nausea, vomiting, SOB, HA or CP.    O:  T(C): 36.2 (02-18-25 @ 17:58), Max: 36.2 (02-18-25 @ 17:58)  T(F): 97.1 (02-18-25 @ 17:58), Max: 97.1 (02-18-25 @ 17:58)  HR: 54 (02-18-25 @ 19:00) (53 - 91)  BP: 113/69 (02-18-25 @ 19:00) (107/72 - 121/79)  RR: 11 (02-18-25 @ 19:00) (11 - 14)  SpO2: 100% (02-18-25 @ 19:00) (100% - 100%)  Wt(kg): --                        11.2   8.27  )-----------( 188      ( 18 Feb 2025 05:30 )             33.3     02-18    137  |  106  |  7   ----------------------------<  59[L]  4.1   |  20[L]  |  0.72    Ca    8.0[L]      18 Feb 2025 05:30  Phos  3.2     02-18  Mg     1.6     02-18      PE:  Gen: NAD, resting comfortably in bed  C/V: NSR  Pulm: Nonlabored breathing, no respiratory distress  Abd: soft, TTP in LLQ, mildly distended       Incision: C/D/I with dermabond in place  Extrem: WWP, no calf edema, SCDs in place      A/P: 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 Ray 11/29/24 presenting with 5 days abdominal bloating with associated nausea with continue bowel function. Patient does not have outside medical records from [place] however patient admitted for concern for partial SBO w/ c/f internal hernia as cause. Will plan to rescan for further management. Now s/p dx lap, reduction and repair of internal hernia at mesentery of 2nd JJA (2/18).     Regional  CLD/IVF  Pain/nausea control prn  SCDs/IS/OOBA/SQH  AM labs

## 2025-02-18 NOTE — PROGRESS NOTE ADULT - SUBJECTIVE AND OBJECTIVE BOX
INTERVAL HPI/OVERNIGHT EVENTS: added on to OR, c/o left epigastric pain (unchanged), mild nausea, given zofran, +F/-BM, -OOBA, started on home PPI  2/17: admitted for possible SBO, added on to OR for 2/18, CTAP no evidence of bowel obstruction        SUBJECTIVE: Patient examined bedside with chief resident. Patient observed resting comfortably and not in distress.  Patient reports she is passing flatus and had a bowel movement overnight. Patient reports she is still feeling bloated. Patient denies SOB, chest pain , nausea and emesis. Patient making adequate urine output.         heparin   Injectable 5000 Unit(s) SubCutaneous every 8 hours      Vital Signs Last 24 Hrs  T(C): 36.7 (18 Feb 2025 05:26), Max: 37.2 (17 Feb 2025 08:51)  T(F): 98 (18 Feb 2025 05:26), Max: 98.9 (17 Feb 2025 08:51)  HR: 67 (18 Feb 2025 05:26) (67 - 93)  BP: 111/72 (18 Feb 2025 05:26) (107/72 - 123/77)  BP(mean): 84 (17 Feb 2025 20:33) (84 - 93)  RR: 17 (18 Feb 2025 05:26) (17 - 18)  SpO2: 98% (18 Feb 2025 05:26) (97% - 99%)    Parameters below as of 18 Feb 2025 05:26  Patient On (Oxygen Delivery Method): room air      I&O's Detail    17 Feb 2025 07:01  -  18 Feb 2025 07:00  --------------------------------------------------------  IN:    Lactated Ringers: 550 mL  Total IN: 550 mL    OUT:    Voided (mL): 1700 mL  Total OUT: 1700 mL    Total NET: -1150 mL          General: NAD, resting comfortably in bed  C/V: NSR  Pulm: Nonlabored breathing, no respiratory distress  Abd: Soft, non-distended, TTP around incision site, incision clean dry and intact.   Extrem: WWP, no edema, SCDs in place      LABS:                        11.2   8.27  )-----------( 188      ( 18 Feb 2025 05:30 )             33.3     02-17    141  |  106  |  7   ----------------------------<  74  4.0   |  26  |  0.78    Ca    8.4      17 Feb 2025 10:11  Phos  3.7     02-17  Mg     1.7     02-17      PT/INR - ( 18 Feb 2025 05:30 )   PT: 11.0 sec;   INR: 0.96          PTT - ( 18 Feb 2025 05:30 )  PTT:29.7 sec  Urinalysis Basic - ( 17 Feb 2025 10:11 )    Color: x / Appearance: x / SG: x / pH: x  Gluc: 74 mg/dL / Ketone: x  / Bili: x / Urobili: x   Blood: x / Protein: x / Nitrite: x   Leuk Esterase: x / RBC: x / WBC x   Sq Epi: x / Non Sq Epi: x / Bacteria: x INTERVAL HPI/OVERNIGHT EVENTS: added on to OR, c/o left epigastric pain (unchanged), mild nausea, given zofran, +F/-BM, -OOBA, started on home PPI  2/17: admitted for possible SBO, added on to OR for 2/18, CTAP no evidence of bowel obstruction        SUBJECTIVE: Patient examined bedside with chief resident. Patient observed resting comfortably and not in distress.  Patient reports she is passing flatus and had a bowel movement overnight. Patient reports she is still feeling bloated. Patient denies SOB, chest pain , nausea and emesis. Patient making adequate urine output.         heparin   Injectable 5000 Unit(s) SubCutaneous every 8 hours      Vital Signs Last 24 Hrs  T(C): 36.7 (18 Feb 2025 05:26), Max: 37.2 (17 Feb 2025 08:51)  T(F): 98 (18 Feb 2025 05:26), Max: 98.9 (17 Feb 2025 08:51)  HR: 67 (18 Feb 2025 05:26) (67 - 93)  BP: 111/72 (18 Feb 2025 05:26) (107/72 - 123/77)  BP(mean): 84 (17 Feb 2025 20:33) (84 - 93)  RR: 17 (18 Feb 2025 05:26) (17 - 18)  SpO2: 98% (18 Feb 2025 05:26) (97% - 99%)    Parameters below as of 18 Feb 2025 05:26  Patient On (Oxygen Delivery Method): room air      I&O's Detail    17 Feb 2025 07:01  -  18 Feb 2025 07:00  --------------------------------------------------------  IN:    Lactated Ringers: 550 mL  Total IN: 550 mL    OUT:    Voided (mL): 1700 mL  Total OUT: 1700 mL    Total NET: -1150 mL          General: NAD, resting comfortably in bed  C/V: NSR  Pulm: Nonlabored breathing, no respiratory distress  Abd: Soft, non-distended and non tender  Extrem: WWP, no edema, SCDs in place      LABS:                        11.2   8.27  )-----------( 188      ( 18 Feb 2025 05:30 )             33.3     02-17    141  |  106  |  7   ----------------------------<  74  4.0   |  26  |  0.78    Ca    8.4      17 Feb 2025 10:11  Phos  3.7     02-17  Mg     1.7     02-17      PT/INR - ( 18 Feb 2025 05:30 )   PT: 11.0 sec;   INR: 0.96          PTT - ( 18 Feb 2025 05:30 )  PTT:29.7 sec  Urinalysis Basic - ( 17 Feb 2025 10:11 )    Color: x / Appearance: x / SG: x / pH: x  Gluc: 74 mg/dL / Ketone: x  / Bili: x / Urobili: x   Blood: x / Protein: x / Nitrite: x   Leuk Esterase: x / RBC: x / WBC x   Sq Epi: x / Non Sq Epi: x / Bacteria: x INTERVAL HPI/OVERNIGHT EVENTS: added on to OR, c/o left epigastric pain (unchanged), mild nausea, given zofran, +F/-BM, -OOBA, started on home PPI  2/17: admitted for possible SBO, added on to OR for 2/18, CTAP no evidence of bowel obstruction        SUBJECTIVE: Patient examined bedside with chief resident. Patient observed resting comfortably and not in distress.  Patient complains of left epigastric pain that comes and goes. Patient reports she is passing flatus and had a bowel movement overnight. Patient reports she is still feeling bloated. Patient denies SOB, chest pain , nausea and emesis. Patient making adequate urine output.         heparin   Injectable 5000 Unit(s) SubCutaneous every 8 hours      Vital Signs Last 24 Hrs  T(C): 36.7 (18 Feb 2025 05:26), Max: 37.2 (17 Feb 2025 08:51)  T(F): 98 (18 Feb 2025 05:26), Max: 98.9 (17 Feb 2025 08:51)  HR: 67 (18 Feb 2025 05:26) (67 - 93)  BP: 111/72 (18 Feb 2025 05:26) (107/72 - 123/77)  BP(mean): 84 (17 Feb 2025 20:33) (84 - 93)  RR: 17 (18 Feb 2025 05:26) (17 - 18)  SpO2: 98% (18 Feb 2025 05:26) (97% - 99%)    Parameters below as of 18 Feb 2025 05:26  Patient On (Oxygen Delivery Method): room air      I&O's Detail    17 Feb 2025 07:01  -  18 Feb 2025 07:00  --------------------------------------------------------  IN:    Lactated Ringers: 550 mL  Total IN: 550 mL    OUT:    Voided (mL): 1700 mL  Total OUT: 1700 mL    Total NET: -1150 mL          General: NAD, resting comfortably in bed  C/V: NSR  Pulm: Nonlabored breathing, no respiratory distress  Abd: Soft, non-distended and non tender  Extrem: WWP, no edema, SCDs in place      LABS:                        11.2   8.27  )-----------( 188      ( 18 Feb 2025 05:30 )             33.3     02-17    141  |  106  |  7   ----------------------------<  74  4.0   |  26  |  0.78    Ca    8.4      17 Feb 2025 10:11  Phos  3.7     02-17  Mg     1.7     02-17      PT/INR - ( 18 Feb 2025 05:30 )   PT: 11.0 sec;   INR: 0.96          PTT - ( 18 Feb 2025 05:30 )  PTT:29.7 sec  Urinalysis Basic - ( 17 Feb 2025 10:11 )    Color: x / Appearance: x / SG: x / pH: x  Gluc: 74 mg/dL / Ketone: x  / Bili: x / Urobili: x   Blood: x / Protein: x / Nitrite: x   Leuk Esterase: x / RBC: x / WBC x   Sq Epi: x / Non Sq Epi: x / Bacteria: x

## 2025-02-18 NOTE — DISCHARGE NOTE PROVIDER - HOSPITAL COURSE
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 Ray 11/29/24, who presented with 5 days abdominal bloating with associated nausea. Pain first started on Sunday, described as intermittent soreness rated 8/10 at its worst. She took her home pantoprazole to relieve symptoms however had no relief. +flatus, last bm day prior to presentation. Denied vomiting, bloody bms, urinary symptoms, chest pain, and shortness of breath. At OSH, pt afebrile, nontachycardic, normotensive, and satting on RA. On exam, soft, mildly distended, TTP LLQ and epigastrium. Labs were significant for WBC 10 Hgb 13.3. CTAP with no evidence of SBO. Patient was admitted o general surgery service and taken to OR on 2/18 for diagnostic laparoscopy, reduction and [facility] of [address] [place] [address]. No perioperative complications were noted. Diet was advanced as tolerated. At time of discharge pt is tolerating diet, pain well controlled, pt is ambulating/voiding freely, having adequate bowel function. Pt is hemodynamically normal/stable and medically ready for discharge with outpatient follow-up.      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 Ray 11/29/24, who presented with 5 days abdominal bloating with associated nausea. Pain first started on Sunday, described as intermittent soreness rated 8/10 at its worst. She took her home pantoprazole to relieve symptoms however had no relief. +flatus, last bm day prior to presentation. Denied vomiting, bloody bms, urinary symptoms, chest pain, and shortness of breath. At OSH, pt afebrile, nontachycardic, normotensive, and satting on RA. On exam, soft, mildly distended, TTP LLQ and epigastrium. Labs were significant for WBC 10 Hgb 13.3. CTAP with no evidence of SBO. Patient was admitted o general surgery service and taken to OR on 2/18 for diagnostic laparoscopy, reduction and repair of internal hernia at [facility] of [address] [place] [address]. No perioperative complications were noted. Diet was advanced as tolerated. At time of discharge pt is tolerating diet, pain well controlled, pt is ambulating/voiding freely, having adequate bowel function. Pt is hemodynamically normal/stable and medically ready for discharge with outpatient follow-up.

## 2025-02-19 LAB
ANION GAP SERPL CALC-SCNC: 11 MMOL/L — SIGNIFICANT CHANGE UP (ref 5–17)
BUN SERPL-MCNC: 4 MG/DL — LOW (ref 7–23)
CALCIUM SERPL-MCNC: 8 MG/DL — LOW (ref 8.4–10.5)
CHLORIDE SERPL-SCNC: 101 MMOL/L — SIGNIFICANT CHANGE UP (ref 96–108)
CO2 SERPL-SCNC: 26 MMOL/L — SIGNIFICANT CHANGE UP (ref 22–31)
CREAT SERPL-MCNC: 0.81 MG/DL — SIGNIFICANT CHANGE UP (ref 0.5–1.3)
EGFR: 92 ML/MIN/1.73M2 — SIGNIFICANT CHANGE UP
GLUCOSE SERPL-MCNC: 64 MG/DL — LOW (ref 70–99)
HCT VFR BLD CALC: 33.1 % — LOW (ref 34.5–45)
HGB BLD-MCNC: 10.8 G/DL — LOW (ref 11.5–15.5)
MAGNESIUM SERPL-MCNC: 1.6 MG/DL — SIGNIFICANT CHANGE UP (ref 1.6–2.6)
MCHC RBC-ENTMCNC: 31.9 PG — SIGNIFICANT CHANGE UP (ref 27–34)
MCHC RBC-ENTMCNC: 32.6 G/DL — SIGNIFICANT CHANGE UP (ref 32–36)
MCV RBC AUTO: 97.6 FL — SIGNIFICANT CHANGE UP (ref 80–100)
NRBC BLD AUTO-RTO: 0 /100 WBCS — SIGNIFICANT CHANGE UP (ref 0–0)
PHOSPHATE SERPL-MCNC: 3.3 MG/DL — SIGNIFICANT CHANGE UP (ref 2.5–4.5)
PLATELET # BLD AUTO: 174 K/UL — SIGNIFICANT CHANGE UP (ref 150–400)
POTASSIUM SERPL-MCNC: 3.7 MMOL/L — SIGNIFICANT CHANGE UP (ref 3.5–5.3)
POTASSIUM SERPL-SCNC: 3.7 MMOL/L — SIGNIFICANT CHANGE UP (ref 3.5–5.3)
RBC # BLD: 3.39 M/UL — LOW (ref 3.8–5.2)
RBC # FLD: 12.3 % — SIGNIFICANT CHANGE UP (ref 10.3–14.5)
SODIUM SERPL-SCNC: 138 MMOL/L — SIGNIFICANT CHANGE UP (ref 135–145)
WBC # BLD: 7.3 K/UL — SIGNIFICANT CHANGE UP (ref 3.8–10.5)
WBC # FLD AUTO: 7.3 K/UL — SIGNIFICANT CHANGE UP (ref 3.8–10.5)

## 2025-02-19 RX ORDER — KETOROLAC TROMETHAMINE 30 MG/ML
15 INJECTION, SOLUTION INTRAMUSCULAR; INTRAVENOUS EVERY 8 HOURS
Refills: 0 | Status: DISCONTINUED | OUTPATIENT
Start: 2025-02-19 | End: 2025-02-20

## 2025-02-19 RX ORDER — MAGNESIUM SULFATE 500 MG/ML
2 SYRINGE (ML) INJECTION ONCE
Refills: 0 | Status: COMPLETED | OUTPATIENT
Start: 2025-02-19 | End: 2025-02-19

## 2025-02-19 RX ORDER — ACETAMINOPHEN 500 MG/5ML
1000 LIQUID (ML) ORAL EVERY 6 HOURS
Refills: 0 | Status: COMPLETED | OUTPATIENT
Start: 2025-02-19 | End: 2025-02-20

## 2025-02-19 RX ORDER — HYDROMORPHONE/SOD CHLOR,ISO/PF 2 MG/10 ML
0.5 SYRINGE (ML) INJECTION ONCE
Refills: 0 | Status: DISCONTINUED | OUTPATIENT
Start: 2025-02-19 | End: 2025-02-19

## 2025-02-19 RX ORDER — SODIUM CHLORIDE 9 G/1000ML
1000 INJECTION, SOLUTION INTRAVENOUS
Refills: 0 | Status: DISCONTINUED | OUTPATIENT
Start: 2025-02-19 | End: 2025-02-19

## 2025-02-19 RX ORDER — MAGNESIUM SULFATE 500 MG/ML
2 SYRINGE (ML) INJECTION ONCE
Refills: 0 | Status: DISCONTINUED | OUTPATIENT
Start: 2025-02-19 | End: 2025-02-19

## 2025-02-19 RX ADMIN — Medication 0.2 MILLIGRAM(S): at 09:30

## 2025-02-19 RX ADMIN — Medication 4 MILLIGRAM(S): at 11:51

## 2025-02-19 RX ADMIN — Medication 40 MILLIGRAM(S): at 11:51

## 2025-02-19 RX ADMIN — Medication 0.2 MILLIGRAM(S): at 01:10

## 2025-02-19 RX ADMIN — Medication 25 GRAM(S): at 18:18

## 2025-02-19 RX ADMIN — HEPARIN SODIUM 5000 UNIT(S): 1000 INJECTION INTRAVENOUS; SUBCUTANEOUS at 01:10

## 2025-02-19 RX ADMIN — Medication 0.5 MILLIGRAM(S): at 14:24

## 2025-02-19 RX ADMIN — HEPARIN SODIUM 5000 UNIT(S): 1000 INJECTION INTRAVENOUS; SUBCUTANEOUS at 10:19

## 2025-02-19 RX ADMIN — SODIUM CHLORIDE 110 MILLILITER(S): 9 INJECTION, SOLUTION INTRAVENOUS at 15:07

## 2025-02-19 RX ADMIN — KETOROLAC TROMETHAMINE 15 MILLIGRAM(S): 30 INJECTION, SOLUTION INTRAMUSCULAR; INTRAVENOUS at 16:03

## 2025-02-19 RX ADMIN — Medication 0.2 MILLIGRAM(S): at 09:45

## 2025-02-19 RX ADMIN — Medication 100 MILLIEQUIVALENT(S): at 16:04

## 2025-02-19 RX ADMIN — OXYCODONE HYDROCHLORIDE 5 MILLIGRAM(S): 30 TABLET ORAL at 13:42

## 2025-02-19 RX ADMIN — Medication 400 MILLIGRAM(S): at 18:18

## 2025-02-19 RX ADMIN — Medication 100 MILLIEQUIVALENT(S): at 13:45

## 2025-02-19 RX ADMIN — OXYCODONE HYDROCHLORIDE 5 MILLIGRAM(S): 30 TABLET ORAL at 12:42

## 2025-02-19 RX ADMIN — Medication 100 MILLIEQUIVALENT(S): at 11:54

## 2025-02-19 RX ADMIN — Medication 0.2 MILLIGRAM(S): at 00:52

## 2025-02-19 RX ADMIN — OXYCODONE HYDROCHLORIDE 5 MILLIGRAM(S): 30 TABLET ORAL at 06:36

## 2025-02-19 RX ADMIN — SODIUM CHLORIDE 110 MILLILITER(S): 9 INJECTION, SOLUTION INTRAVENOUS at 10:19

## 2025-02-19 RX ADMIN — Medication 0.5 MILLIGRAM(S): at 14:09

## 2025-02-19 RX ADMIN — HEPARIN SODIUM 5000 UNIT(S): 1000 INJECTION INTRAVENOUS; SUBCUTANEOUS at 18:18

## 2025-02-19 NOTE — PROGRESS NOTE ADULT - SUBJECTIVE AND OBJECTIVE BOX
SUBJECTIVE: Pt seen on rounds this AM. She has been having abdominal pain, requiring pain meds for relief. Denies ROBF. Denies N/V.       MEDICATIONS  (STANDING):  dextrose 5% + lactated ringers. 1000 milliLiter(s) (110 mL/Hr) IV Continuous <Continuous>  heparin   Injectable 5000 Unit(s) SubCutaneous every 8 hours  influenza   Vaccine 0.5 milliLiter(s) IntraMuscular once  magnesium sulfate  IVPB 2 Gram(s) IV Intermittent once  pantoprazole  Injectable 40 milliGRAM(s) IV Push daily  potassium chloride  10 mEq/100 mL IVPB 10 milliEquivalent(s) IV Intermittent every 1 hour    MEDICATIONS  (PRN):  acetaminophen     Tablet .. 650 milliGRAM(s) Oral every 6 hours PRN Mild Pain (1 - 3)  acetaminophen   IVPB .. 1000 milliGRAM(s) IV Intermittent once PRN Mild Pain (1 - 3)  HYDROmorphone  Injectable 0.2 milliGRAM(s) IV Push every 6 hours PRN breakthrough pain  ondansetron Injectable 4 milliGRAM(s) IV Push every 6 hours PRN Nausea and/or Vomiting  oxyCODONE    IR 2.5 milliGRAM(s) Oral every 6 hours PRN Moderate Pain (4 - 6)  oxyCODONE    IR 5 milliGRAM(s) Oral every 6 hours PRN Severe Pain (7 - 10)      Vital Signs Last 24 Hrs  T(C): 37 (19 Feb 2025 12:39), Max: 37 (19 Feb 2025 12:39)  T(F): 98.6 (19 Feb 2025 12:39), Max: 98.6 (19 Feb 2025 12:39)  HR: 73 (19 Feb 2025 12:39) (53 - 91)  BP: 113/76 (19 Feb 2025 12:39) (100/64 - 124/82)  BP(mean): 79 (18 Feb 2025 20:50) (77 - 97)  RR: 17 (19 Feb 2025 12:39) (11 - 18)  SpO2: 98% (19 Feb 2025 12:39) (98% - 100%)    Parameters below as of 19 Feb 2025 08:45  Patient On (Oxygen Delivery Method): room air        PHYSICAL EXAM:      Constitutional: A&Ox3    Respiratory: non labored breathing, no respiratory distress    Cardiovascular: NSR, RRR    Gastrointestinal: soft, appropriately TTP in epigastric region, ND                 Incision: C/D/I    Extremities: (-) edema                  I&O's Detail    18 Feb 2025 07:01  -  19 Feb 2025 07:00  --------------------------------------------------------  IN:    Lactated Ringers: 990 mL    Oral Fluid: 450 mL  Total IN: 1440 mL    OUT:    Voided (mL): 950 mL  Total OUT: 950 mL    Total NET: 490 mL      19 Feb 2025 07:01  -  19 Feb 2025 15:37  --------------------------------------------------------  IN:  Total IN: 0 mL    OUT:    Voided (mL): 400 mL  Total OUT: 400 mL    Total NET: -400 mL          LABS:                        10.8   7.30  )-----------( 174      ( 19 Feb 2025 05:30 )             33.1     02-19    138  |  101  |  4[L]  ----------------------------<  64[L]  3.7   |  26  |  0.81    Ca    8.0[L]      19 Feb 2025 05:30  Phos  3.3     02-19  Mg     1.6     02-19      PT/INR - ( 18 Feb 2025 05:30 )   PT: 11.0 sec;   INR: 0.96          PTT - ( 18 Feb 2025 05:30 )  PTT:29.7 sec  Urinalysis Basic - ( 19 Feb 2025 05:30 )    Color: x / Appearance: x / SG: x / pH: x  Gluc: 64 mg/dL / Ketone: x  / Bili: x / Urobili: x   Blood: x / Protein: x / Nitrite: x   Leuk Esterase: x / RBC: x / WBC x   Sq Epi: x / Non Sq Epi: x / Bacteria: x        RADIOLOGY & ADDITIONAL STUDIES:

## 2025-02-19 NOTE — PROGRESS NOTE ADULT - ASSESSMENT
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 Ray 11/29/24 presenting with 5 days abdominal bloating with associated nausea with continue bowel function. Patient does not have outside medical records from [place] however patient admitted for concern for partial SBO w/ c/f internal hernia as cause. Will plan to rescan for further management. Now s/p dx lap, reduction and repair of internal hernia at mesentery of 2nd JJA (2/18), recovering well.    CLD/IVF  Pain/nausea control prn  SCDs/IS/OOBA/SQH  Await YUSRA  AM labs

## 2025-02-20 ENCOUNTER — TRANSCRIPTION ENCOUNTER (OUTPATIENT)
Age: 45
End: 2025-02-20

## 2025-02-20 VITALS
RESPIRATION RATE: 16 BRPM | SYSTOLIC BLOOD PRESSURE: 103 MMHG | OXYGEN SATURATION: 97 % | HEART RATE: 69 BPM | DIASTOLIC BLOOD PRESSURE: 68 MMHG | TEMPERATURE: 98 F

## 2025-02-20 LAB
ANION GAP SERPL CALC-SCNC: 5 MMOL/L — SIGNIFICANT CHANGE UP (ref 5–17)
BUN SERPL-MCNC: 2 MG/DL — LOW (ref 7–23)
CALCIUM SERPL-MCNC: 8 MG/DL — LOW (ref 8.4–10.5)
CHLORIDE SERPL-SCNC: 106 MMOL/L — SIGNIFICANT CHANGE UP (ref 96–108)
CO2 SERPL-SCNC: 27 MMOL/L — SIGNIFICANT CHANGE UP (ref 22–31)
CREAT SERPL-MCNC: 0.8 MG/DL — SIGNIFICANT CHANGE UP (ref 0.5–1.3)
EGFR: 94 ML/MIN/1.73M2 — SIGNIFICANT CHANGE UP
GLUCOSE SERPL-MCNC: 71 MG/DL — SIGNIFICANT CHANGE UP (ref 70–99)
HCT VFR BLD CALC: 29.6 % — LOW (ref 34.5–45)
HGB BLD-MCNC: 10.1 G/DL — LOW (ref 11.5–15.5)
MAGNESIUM SERPL-MCNC: 1.7 MG/DL — SIGNIFICANT CHANGE UP (ref 1.6–2.6)
MCHC RBC-ENTMCNC: 33.7 PG — SIGNIFICANT CHANGE UP (ref 27–34)
MCHC RBC-ENTMCNC: 34.1 G/DL — SIGNIFICANT CHANGE UP (ref 32–36)
MCV RBC AUTO: 98.7 FL — SIGNIFICANT CHANGE UP (ref 80–100)
NRBC BLD AUTO-RTO: 0 /100 WBCS — SIGNIFICANT CHANGE UP (ref 0–0)
PHOSPHATE SERPL-MCNC: 3.1 MG/DL — SIGNIFICANT CHANGE UP (ref 2.5–4.5)
PLATELET # BLD AUTO: 172 K/UL — SIGNIFICANT CHANGE UP (ref 150–400)
POTASSIUM SERPL-MCNC: 3.8 MMOL/L — SIGNIFICANT CHANGE UP (ref 3.5–5.3)
POTASSIUM SERPL-SCNC: 3.8 MMOL/L — SIGNIFICANT CHANGE UP (ref 3.5–5.3)
RBC # BLD: 3 M/UL — LOW (ref 3.8–5.2)
RBC # FLD: 12.4 % — SIGNIFICANT CHANGE UP (ref 10.3–14.5)
SODIUM SERPL-SCNC: 138 MMOL/L — SIGNIFICANT CHANGE UP (ref 135–145)
WBC # BLD: 4.4 K/UL — SIGNIFICANT CHANGE UP (ref 3.8–10.5)
WBC # FLD AUTO: 4.4 K/UL — SIGNIFICANT CHANGE UP (ref 3.8–10.5)

## 2025-02-20 PROCEDURE — 82330 ASSAY OF CALCIUM: CPT

## 2025-02-20 PROCEDURE — 84132 ASSAY OF SERUM POTASSIUM: CPT

## 2025-02-20 PROCEDURE — 85027 COMPLETE CBC AUTOMATED: CPT

## 2025-02-20 PROCEDURE — 84295 ASSAY OF SERUM SODIUM: CPT

## 2025-02-20 PROCEDURE — 82805 BLOOD GASES W/O2 SATURATION: CPT

## 2025-02-20 PROCEDURE — 81025 URINE PREGNANCY TEST: CPT

## 2025-02-20 PROCEDURE — 82947 ASSAY GLUCOSE BLOOD QUANT: CPT

## 2025-02-20 PROCEDURE — 74177 CT ABD & PELVIS W/CONTRAST: CPT | Mod: MC

## 2025-02-20 PROCEDURE — C9399: CPT

## 2025-02-20 PROCEDURE — 80048 BASIC METABOLIC PNL TOTAL CA: CPT

## 2025-02-20 PROCEDURE — 36415 COLL VENOUS BLD VENIPUNCTURE: CPT

## 2025-02-20 PROCEDURE — 86901 BLOOD TYPING SEROLOGIC RH(D): CPT

## 2025-02-20 PROCEDURE — 82962 GLUCOSE BLOOD TEST: CPT

## 2025-02-20 PROCEDURE — 83605 ASSAY OF LACTIC ACID: CPT

## 2025-02-20 PROCEDURE — 83735 ASSAY OF MAGNESIUM: CPT

## 2025-02-20 PROCEDURE — 86850 RBC ANTIBODY SCREEN: CPT

## 2025-02-20 PROCEDURE — 85730 THROMBOPLASTIN TIME PARTIAL: CPT

## 2025-02-20 PROCEDURE — 84100 ASSAY OF PHOSPHORUS: CPT

## 2025-02-20 PROCEDURE — 85610 PROTHROMBIN TIME: CPT

## 2025-02-20 PROCEDURE — 86900 BLOOD TYPING SEROLOGIC ABO: CPT

## 2025-02-20 RX ORDER — ACETAMINOPHEN 500 MG/5ML
20 LIQUID (ML) ORAL
Qty: 320 | Refills: 0
Start: 2025-02-20 | End: 2025-02-23

## 2025-02-20 RX ORDER — OXYCODONE HYDROCHLORIDE 30 MG/1
5 TABLET ORAL EVERY 6 HOURS
Refills: 0 | Status: DISCONTINUED | OUTPATIENT
Start: 2025-02-20 | End: 2025-02-20

## 2025-02-20 RX ORDER — OXYCODONE HYDROCHLORIDE 30 MG/1
5 TABLET ORAL
Qty: 80 | Refills: 0
Start: 2025-02-20 | End: 2025-02-23

## 2025-02-20 RX ORDER — MAGNESIUM SULFATE 500 MG/ML
2 SYRINGE (ML) INJECTION ONCE
Refills: 0 | Status: COMPLETED | OUTPATIENT
Start: 2025-02-20 | End: 2025-02-20

## 2025-02-20 RX ADMIN — Medication 400 MILLIGRAM(S): at 00:03

## 2025-02-20 RX ADMIN — Medication 25 GRAM(S): at 11:12

## 2025-02-20 RX ADMIN — Medication 400 MILLIGRAM(S): at 06:03

## 2025-02-20 RX ADMIN — HEPARIN SODIUM 5000 UNIT(S): 1000 INJECTION INTRAVENOUS; SUBCUTANEOUS at 10:20

## 2025-02-20 RX ADMIN — Medication 20 MILLIEQUIVALENT(S): at 11:12

## 2025-02-20 RX ADMIN — KETOROLAC TROMETHAMINE 15 MILLIGRAM(S): 30 INJECTION, SOLUTION INTRAMUSCULAR; INTRAVENOUS at 07:37

## 2025-02-20 RX ADMIN — HEPARIN SODIUM 5000 UNIT(S): 1000 INJECTION INTRAVENOUS; SUBCUTANEOUS at 01:16

## 2025-02-20 RX ADMIN — Medication 400 MILLIGRAM(S): at 12:04

## 2025-02-20 RX ADMIN — KETOROLAC TROMETHAMINE 15 MILLIGRAM(S): 30 INJECTION, SOLUTION INTRAMUSCULAR; INTRAVENOUS at 00:04

## 2025-02-20 RX ADMIN — Medication 40 MILLIGRAM(S): at 11:12

## 2025-02-20 NOTE — PROGRESS NOTE ADULT - ASSESSMENT
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 Ray 11/29/24 presenting with 5 days abdominal bloating with associated nausea with continue bowel function. Patient does not have outside medical records from [place] however patient admitted for concern for partial SBO w/ c/f internal hernia as cause. Will plan to rescan for further management. Now s/p dx lap, reduction and repair of internal hernia at mesentery of 2nd JJA (2/18), recovering well, with ROBF, and improved pain control.     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 Ray 11/29/24 presenting with 5 days abdominal bloating with associated nausea with continue bowel function. Patient does not have outside medical records from [place] however patient admitted for concern for partial SBO w/ c/f internal hernia as cause. Will plan to rescan for further management. Now s/p dx lap, reduction and repair of internal hernia at mesentery of 2nd JJA (2/18), recovering well, with ROBF, and improved pain control.     Advance to Regular Diet  Pain/nausea control prn  SCDs/IS/OOBA/SQH  AM labs  Dispo: Home  Patients body is deconditioned and needs  rolling walking  when discharged for mobility related activity of daily living.

## 2025-02-20 NOTE — DISCHARGE NOTE NURSING/CASE MANAGEMENT/SOCIAL WORK - PATIENT PORTAL LINK FT
You can access the FollowMyHealth Patient Portal offered by [facility] by registering at the following website: [email or web address]. By joining [facility]’s FollowMyHealth portal, you will also be able to view your health information using other applications (apps) compatible with our system.

## 2025-02-20 NOTE — PROGRESS NOTE ADULT - SUBJECTIVE AND OBJECTIVE BOX
SUBJECTIVE: Pt seen on rounds this AM. Endorses improved pain control with new medication regimen, tolerating diet w/ full ROBF.       MEDICATIONS  (STANDING):  acetaminophen   IVPB .. 1000 milliGRAM(s) IV Intermittent every 6 hours  heparin   Injectable 5000 Unit(s) SubCutaneous every 8 hours  influenza   Vaccine 0.5 milliLiter(s) IntraMuscular once  ketorolac   Injectable 15 milliGRAM(s) IV Push every 8 hours  pantoprazole  Injectable 40 milliGRAM(s) IV Push daily    MEDICATIONS  (PRN):  HYDROmorphone  Injectable 0.2 milliGRAM(s) IV Push every 6 hours PRN breakthrough pain  ondansetron Injectable 4 milliGRAM(s) IV Push every 6 hours PRN Nausea and/or Vomiting      Vital Signs Last 24 Hrs  T(C): 36.6 (20 Feb 2025 05:48), Max: 37 (19 Feb 2025 12:39)  T(F): 97.9 (20 Feb 2025 05:48), Max: 98.6 (19 Feb 2025 12:39)  HR: 70 (20 Feb 2025 05:48) (61 - 73)  BP: 109/68 (20 Feb 2025 05:48) (105/70 - 124/82)  BP(mean): 82 (19 Feb 2025 16:39) (82 - 82)  RR: 17 (20 Feb 2025 05:48) (15 - 17)  SpO2: 97% (20 Feb 2025 05:48) (97% - 99%)    Parameters below as of 20 Feb 2025 05:48  Patient On (Oxygen Delivery Method): room air        PHYSICAL EXAM:      Constitutional: A&Ox3    Respiratory: non labored breathing, no respiratory distress    Cardiovascular: NSR, RRR    Gastrointestinal: soft, ND, mildly TTP in epigastric region                 Incision: C/D/I    Extremities: (-) edema                  I&O's Detail    19 Feb 2025 07:01  -  20 Feb 2025 07:00  --------------------------------------------------------  IN:    dextrose 5% + lactated ringers: 330 mL    Oral Fluid: 840 mL  Total IN: 1170 mL    OUT:    Voided (mL): 2100 mL  Total OUT: 2100 mL    Total NET: -930 mL          LABS:                        10.1   4.40  )-----------( 172      ( 20 Feb 2025 05:30 )             29.6     02-20    138  |  106  |  2[L]  ----------------------------<  71  3.8   |  27  |  0.80    Ca    8.0[L]      20 Feb 2025 05:30  Phos  3.1     02-20  Mg     1.7     02-20        Urinalysis Basic - ( 20 Feb 2025 05:30 )    Color: x / Appearance: x / SG: x / pH: x  Gluc: 71 mg/dL / Ketone: x  / Bili: x / Urobili: x   Blood: x / Protein: x / Nitrite: x   Leuk Esterase: x / RBC: x / WBC x   Sq Epi: x / Non Sq Epi: x / Bacteria: x        RADIOLOGY & ADDITIONAL STUDIES:

## 2025-02-20 NOTE — DISCHARGE NOTE NURSING/CASE MANAGEMENT/SOCIAL WORK - NSDCFUADDAPPT_GEN_ALL_CORE_FT
Please schedule a follow-up appointment with Dr. Ray within 1-2 weeks of discharge from the hospital. You may reach his office at [phone] at your earliest convenience.

## 2025-02-20 NOTE — DISCHARGE NOTE NURSING/CASE MANAGEMENT/SOCIAL WORK - NSDCPEFALRISK_GEN_ALL_CORE
For information on Fall & Injury Prevention, visit: [email or web address] OR  [email or web address] OR  https://www.cdc.gov/steadi/patient.html

## 2025-02-20 NOTE — DISCHARGE NOTE NURSING/CASE MANAGEMENT/SOCIAL WORK - FINANCIAL ASSISTANCE
[facility] provides services at a reduced cost to those who are determined to be eligible through [facility]’s financial assistance program. Information regarding [facility]’s financial assistance program can be found by going to [email or web address] or by calling [phone].

## 2025-02-20 NOTE — CHART NOTE - NSCHARTNOTEFT_GEN_A_CORE
Patients body is deconditioned and needs  rolling walking  when discharged for mobility related activity of daily living.

## 2025-02-25 DIAGNOSIS — Z90.89 ACQUIRED ABSENCE OF OTHER ORGANS: ICD-10-CM

## 2025-02-25 DIAGNOSIS — Z98.84 BARIATRIC SURGERY STATUS: ICD-10-CM

## 2025-02-25 DIAGNOSIS — E11.9 TYPE 2 DIABETES MELLITUS WITHOUT COMPLICATIONS: ICD-10-CM

## 2025-02-25 DIAGNOSIS — K46.9 UNSPECIFIED ABDOMINAL HERNIA WITHOUT OBSTRUCTION OR GANGRENE: ICD-10-CM

## 2025-02-25 DIAGNOSIS — K21.9 GASTRO-ESOPHAGEAL REFLUX DISEASE WITHOUT ESOPHAGITIS: ICD-10-CM

## 2025-02-25 DIAGNOSIS — K56.609 UNSPECIFIED INTESTINAL OBSTRUCTION, UNSPECIFIED AS TO PARTIAL VERSUS COMPLETE OBSTRUCTION: ICD-10-CM

## 2025-02-25 DIAGNOSIS — I10 ESSENTIAL (PRIMARY) HYPERTENSION: ICD-10-CM

## 2025-03-06 ENCOUNTER — APPOINTMENT (OUTPATIENT)
Dept: SURGERY | Facility: CLINIC | Age: 45
End: 2025-03-06
Payer: MEDICAID

## 2025-03-06 VITALS
HEART RATE: 75 BPM | SYSTOLIC BLOOD PRESSURE: 120 MMHG | BODY MASS INDEX: 22.5 KG/M2 | OXYGEN SATURATION: 100 % | DIASTOLIC BLOOD PRESSURE: 76 MMHG | WEIGHT: 140 LBS | TEMPERATURE: 98.8 F | HEIGHT: 66 IN

## 2025-03-06 DIAGNOSIS — Z98.84 BARIATRIC SURGERY STATUS: ICD-10-CM

## 2025-03-06 PROCEDURE — 99024 POSTOP FOLLOW-UP VISIT: CPT

## 2025-07-21 NOTE — DISCHARGE NOTE PROVIDER - NSDCACTIVITY_GEN_ALL_CORE
Showering allowed/Stairs allowed/Walking - Indoors allowed/No heavy lifting/straining/Walking - Outdoors allowed/Follow Instructions Provided by your Surgical Team n/a

## (undated) DEVICE — TROCAR COVIDIEN VERSAPORT BLADELESS OPTICAL 5MM STANDARD

## (undated) DEVICE — PACK EXPLORATORY LAPAROTOMY

## (undated) DEVICE — TROCAR COVIDIEN VERSAONE FIXATION CANNULA 5MM

## (undated) DEVICE — POSITIONER FOAM EGG CRATE ULNAR 2PCS (PINK)

## (undated) DEVICE — TUBING STRYKER PNEUMOSURE HI FLOW INSUFFLATOR

## (undated) DEVICE — SUT PROLENE 5-0 30" RB-1

## (undated) DEVICE — APPLICATOR FOR FLOSEAL

## (undated) DEVICE — VENODYNE/SCD SLEEVE CALF MEDIUM

## (undated) DEVICE — SUT SILK 0 30" TIES

## (undated) DEVICE — Device

## (undated) DEVICE — SUT PROLENE 4-0 36" SH

## (undated) DEVICE — SUT MONOCRYL 4-0 18" PS-2

## (undated) DEVICE — SUT PROLENE 5-0 36" C-1 VISI-BLACK NDL

## (undated) DEVICE — TIP METZENBAUM SCISSOR MONOPOLAR ENDOCUT (ORANGE)

## (undated) DEVICE — SUT SILK 4-0 30" SH

## (undated) DEVICE — LIGASURE IMPACT

## (undated) DEVICE — SUT SILK 3-0 30" TIES

## (undated) DEVICE — SUT SILK 2-0 30" PSL

## (undated) DEVICE — SUT SILK 3-0 30" SH

## (undated) DEVICE — DRSG DERMABOND 0.7ML

## (undated) DEVICE — SUT VICRYL PLUS 0 54" TIES

## (undated) DEVICE — SUT MONOCRYL 2-0 27" UR-6

## (undated) DEVICE — SUT SILK 5-0 30" RB-1

## (undated) DEVICE — SUT SILK 2-0 30" TIES

## (undated) DEVICE — STAPLER COVIDIEN ENDO GIA SHORT HANDLE

## (undated) DEVICE — FOLEY TRAY 16FR 5CC LF UMETER CLOSED

## (undated) DEVICE — D HELP - CLEARVIEW CLEARIFY SYSTEM

## (undated) DEVICE — LIGASURE BLUNT TIP 5MM X 37CM

## (undated) DEVICE — PACK GENERAL LAPAROSCOPY